# Patient Record
Sex: FEMALE | Race: ASIAN | NOT HISPANIC OR LATINO | Employment: FULL TIME | URBAN - METROPOLITAN AREA
[De-identification: names, ages, dates, MRNs, and addresses within clinical notes are randomized per-mention and may not be internally consistent; named-entity substitution may affect disease eponyms.]

---

## 2024-02-16 ENCOUNTER — TRANSFERRED RECORDS (OUTPATIENT)
Dept: HEALTH INFORMATION MANAGEMENT | Facility: CLINIC | Age: 29
End: 2024-02-16
Payer: COMMERCIAL

## 2024-02-16 ENCOUNTER — TELEPHONE (OUTPATIENT)
Dept: OBGYN | Facility: CLINIC | Age: 29
End: 2024-02-16
Payer: COMMERCIAL

## 2024-02-16 NOTE — TELEPHONE ENCOUNTER
Called Giulia to schedule her earlier with US.    Rescheduled OBI to next Monday, first provider appt for 2/27. No ultrasound needed.      Pt has reached out to her previous OB/GYN clinic, who should be sending over records ASAP.

## 2024-02-19 ENCOUNTER — VIRTUAL VISIT (OUTPATIENT)
Dept: OBGYN | Facility: CLINIC | Age: 29
End: 2024-02-19
Attending: ADVANCED PRACTICE MIDWIFE
Payer: COMMERCIAL

## 2024-02-19 DIAGNOSIS — Z34.93 PRENATAL CARE IN THIRD TRIMESTER: Primary | ICD-10-CM

## 2024-02-19 RX ORDER — OMEGA-3 FATTY ACIDS/FISH OIL 300-1000MG
715 CAPSULE ORAL
COMMUNITY

## 2024-02-19 RX ORDER — DIPHENHYDRAMINE HCL 25 MG
25 TABLET ORAL EVERY 6 HOURS PRN
Status: ON HOLD | COMMUNITY
End: 2024-03-30

## 2024-02-19 NOTE — LETTER
2/19/2024     RE: Giulia Spivey  [address]  [facility] [address]     Dear Colleague,    Thank you for referring your patient, Giulia Spivey, to the [facility] [place] at [facility]. Please see a copy of my visit note below.    LES RN Transfer of Care Intake note    28 year old female newly pregnant.  LMP: 6-9-23.    regular cycles  Pregnancy is planned.    Patient is transferring care from: OB GYN Specialist in [place] and has had 10 prenatal visits with this clinic. Gestational age at first OB visit with this pregnancy was 8 weeks    The reason the patient is transferring care is: closer for them and wanted midwives    Partner/support person name and relationship - Reuben.        Symptoms since LMP include nausea in her 1st trimester, bowel changes, breast tenderness, and fatigue. Patient has tried these relief   measures: increased rest and increased fluids.      OB HISTORY  OB History   No obstetric history on file.     OB COMPLICATIONS  *First Pregnancy     PERSONAL/SOCIAL HISTORY    lives with their spouse.  Employment: Full time as a  .  Job involves light activity .  Her partner works as a .     MENTAL HEALTH HISTORY:  none      - Current Medications reviewed   No current outpatient medications on file.           - Co-morbids none No past medical history on file.    - Pre pregnancy weight 150 pounds  pre pregnancy BMI     - Genetic/Infection questionnaire completed, risks include none. Discussed genetic screening options, patient had completed at previous OB first trimester genetic screening  Pt  does not have a recent known exposure to Parvo or CMV so IgG/IgM testing WILL NOT be ordered.   - Labs already drawn this pregnancy include: had drawn at previous OB  - Ultrasound done at 8-8-23 weeks gestation on 8 weeks. Anatomy scan completed on 10-19-23 was 19 weeks  Flu Vaccine.  Would like at visit  COVID  Vaccine: Hx of COVID illness  (details below) and Has received most recent COVID booster. Had Covid 3 years ago  RHogam: not needed  Tdap: received this pregnancy at 28 week visit  - Discussed expectations for routine prenatal care and scheduling.  - Discussed highlights from The Expectant Family booklet on warning signs, safe pregnancy and prevention of infections diseases, nutrition and exercise.  - Patient was encouraged to start prenatal vitamins as tolerated, if experiencing nausea and vomiting then OK to switch to folic acid only at this time.    - Additional items: None  - Reconciled and reviewed problem list  - Pt scheduled for NOB on 2-27-24  -patient has had anatomy scan at 19 weeks  -did have another US on 1-25-24 due to her fundal height was measuring small, but baby is AGA per US  -instructed to have her records faxed to us at [phone]  -given instructions to go to the birth place if she feels she is in labor before we see her or to call us at [phone]    Liz Javed RN

## 2024-02-19 NOTE — PROGRESS NOTES
LES RN Transfer of Care Intake note    28 year old female newly pregnant.  LMP: 6-9-23.    regular cycles  Pregnancy is planned.    Patient is transferring care from: OB GYN Specialist in [place] and has had 10 prenatal visits with this clinic. Gestational age at first OB visit with this pregnancy was 8 weeks    The reason the patient is transferring care is: closer for them and wanted midwives    Partner/support person name and relationship - Reuben.        Symptoms since LMP include nausea in her 1st trimester, bowel changes, breast tenderness, and fatigue. Patient has tried these relief   measures: increased rest and increased fluids.      OB HISTORY  OB History   No obstetric history on file.       OB COMPLICATIONS  *First Pregnancy       PERSONAL/SOCIAL HISTORY    lives with their spouse.  Employment: Full time as a  .  Job involves light activity .  Her partner works as a .     MENTAL HEALTH HISTORY:  none      - Current Medications reviewed   No current outpatient medications on file.           - Co-morbids none No past medical history on file.    - Pre pregnancy weight 150 pounds  pre pregnancy BMI     - Genetic/Infection questionnaire completed, risks include none. Discussed genetic screening options, patient had completed at previous OB first trimester genetic screening  Pt  does not have a recent known exposure to Parvo or CMV so IgG/IgM testing WILL NOT be ordered.   - Labs already drawn this pregnancy include: had drawn at previous OB  - Ultrasound done at 8-8-23 weeks gestation on 8 weeks. Anatomy scan completed on 10-19-23 was 19 weeks  Flu Vaccine.  Would like at visit  COVID Vaccine: Hx of COVID illness  (details below) and Has received most recent COVID booster. Had Covid 3 years ago  RHogam: not needed  Tdap: received this pregnancy at 28 week visit  - Discussed expectations for routine prenatal care and scheduling.  - Discussed highlights from The Expectant Family booklet on  warning signs, safe pregnancy and prevention of infections diseases, nutrition and exercise.  - Patient was encouraged to start prenatal vitamins as tolerated, if experiencing nausea and vomiting then OK to switch to folic acid only at this time.    - Additional items: None  - Reconciled and reviewed problem list  - Pt scheduled for NOB on 2-27-24  -patient has had anatomy scan at 19 weeks  -did have another US on 1-25-24 due to her fundal height was measuring small, but baby is AGA per US  -instructed to have her records faxed to us at [phone]  -given instructions to go to the birth place if she feels she is in labor before we see her or to call us at [phone]    Liz Javed RN

## 2024-02-19 NOTE — PATIENT INSTRUCTIONS
"Week 37 of Your Pregnancy: Care Instructions    Most babies are born between 37 and 40 weeks.   This is a good time to pack a bag to take with you to the birth. Then it will be ready to go when you are.     Learn about breastfeeding.  For example, find out about ways to hold your baby to make breastfeeding easier. And think about learning how to pump and store milk.     Know that crying is normal.  It's common for babies to cry 1 to 3 hours a day. Some cry more, and some cry less.     Learn why babies cry.  They may be hungry; have gas; need a diaper change; or feel cold, warm, tired, lonely, or tense. Sometimes they cry for unknown reasons.     Think about what will help you stay calm when your baby cries.  Taking slow, deep breaths can help. So can taking a break. It's okay to put your baby somewhere safe (like their crib) and walk away for a few minutes.     Learn about safe sleep for your baby.  Always put your baby to sleep on their back. Place them alone in a crib or bassinet with a firm, flat surface. Keep soft items like stuffed animals out of the crib.     Learn what to expect with  poop.  Your baby will have their own bowel patterns. Some babies have several bowel movements a day. Some have fewer.     Know that  babies will often have loose, yellow bowel movements.  Formula-fed babies have more formed stools. If your baby's poop looks like pellets, your baby is constipated.   Follow-up care is a key part of your treatment and safety. Be sure to make and go to all appointments, and call your doctor if you are having problems. It's also a good idea to know your test results and keep a list of the medicines you take.  Where can you learn more?  Go to https://www.[employer].net/patiented  Enter N257 in the search box to learn more about \"Week 37 of Your Pregnancy: Care Instructions.\"  Current as of: 2023               Content Version: 13.8    3717-6296 [employer].   Care " instructions adapted under license by your healthcare professional. If you have questions about a medical condition or this instruction, always ask your healthcare professional. [employer] disclaims any warranty or liability for your use of this information.      Week 38 of Your Pregnancy: Care Instructions  Believe it or not, your baby is almost here. You may notice how your baby responds to sounds, warmth, cold, and light. You may even know what kind of music your baby likes.    Even if you expect a vaginal birth, it's a good idea to learn about  section ().  is the delivery of a baby through a cut (incision) in your belly. It's a major surgery, so it has more risks than a vaginal birth.   During the first 2 weeks after the birth, limit visitors. Don't allow visitors who have colds or infections. Ask visitors to wash their hands before they touch your baby. And never let anyone smoke around your baby.     Know about unplanned C-sections.  Reasons for an unplanned  include labor that slows or stops, signs of distress in your baby, and high blood pressure or other problems for you.     Know about planned C-sections.  If your baby isn't in a head-down position for delivery (breech position), your doctor may plan a . Or you may have a planned  if you're having twins or more.     Get as much help as you can while you're in the hospital.  You can learn about feeding, diapering, and bathing your baby.     Talk to your doctor or midwife about how to care for the umbilical cord stump.  If your baby will be circumcised, also ask about how to care for that.     Ask friends or family for help, as you need it.  If you can, have another adult in your home for at least 2 or 3 days after the birth.     Try to nap when your baby naps.  This may be your best chance to get some sleep.     Watch for changes in your mental health.  For the first 1 to 2 weeks after  "birth, it's common to cry or feel sad or irritable. If these feelings last for more than 2 weeks, you may have postpartum depression. Ask your doctor for help. Postpartum depression can be treated.   Follow-up care is a key part of your treatment and safety. Be sure to make and go to all appointments, and call your doctor if you are having problems. It's also a good idea to know your test results and keep a list of the medicines you take.  Where can you learn more?  Go to https://www.[employer].net/patiented  Enter B044 in the search box to learn more about \"Week 38 of Your Pregnancy: Care Instructions.\"  Current as of: 2023               Content Version: 13.8    1604-3481 [employer].   Care instructions adapted under license by your healthcare professional. If you have questions about a medical condition or this instruction, always ask your healthcare professional. [employer] disclaims any warranty or liability for your use of this information.      Week 39 of Your Pregnancy: Care Instructions    Index babies can look different than what you see in pictures or movies. Their heads can be a strange shape right after birth. And they may have swollen eyes and red marks on their faces.   You can still get pregnant even if you are breastfeeding. If you don't want to get pregnant, talk to your doctor about birth control.   Tips for week 39 of pregnancy  If you plan to breastfeed, get prepared.     Continue to eat healthy foods.  Keep taking your prenatal vitamins during breastfeeding if your doctor recommends it.  Talk to your doctor before taking any medicines or supplements.  Choose the right birth control for you.     Intrauterine devices (IUDs) are placed in the uterus. Sometimes the IUD can be placed right after giving birth. They work for years.  Hormonal implants are placed under the skin of the arm. They also work for years.  Depo-Provera is a shot. You get it every 3 " "months.  Birth control pills can be used. They're taken every day.  Tubal ligation (tying your tubes) and vasectomy are surgeries. They're permanent.  Diaphragms, spermicide, and condoms must be used each time you have sex. If you used a diaphragm before, you should get refitted after the baby is born.  A birth control patch or ring can be used. These just can't be started until several weeks after you give birth.  Follow-up care is a key part of your treatment and safety. Be sure to make and go to all appointments, and call your doctor if you are having problems. It's also a good idea to know your test results and keep a list of the medicines you take.  Where can you learn more?  Go to https://www.[employer]/patiented  Enter A811 in the search box to learn more about \"Week 39 of Your Pregnancy: Care Instructions.\"  Current as of: 2023               Content Version: 13.8    1531-6464 [employer].   Care instructions adapted under license by your healthcare professional. If you have questions about a medical condition or this instruction, always ask your healthcare professional. [employer] disclaims any warranty or liability for your use of this information.      Weeks 40 to 41 of Your Pregnancy: Care Instructions  By week 40, you've reached your due date. Your baby could be coming any day. Most babies born after their due dates are healthy. But you may have tests to make sure everything is okay. If you feel stressed, you could try a meditation exercise, such as guided imagery. It may help you relax.    If you are past 41 weeks, your doctor may measure the amount of fluid that surrounds your baby. They may also test your baby's movement and heart rate.   If you don't start labor on your own by 41 or 42 weeks, your doctor may want to start (induce) labor. If there are other concerns, your doctor may talk to you about a .   To start (induce) your labor, your doctor may " "do any of these things.    Place a narrow tube with a small balloon on the end (balloon catheter) into your cervix.  The doctor inflates the balloon. This helps your cervix open (dilate).     Sweep the membranes (separate the lining of the amniotic sac from the uterus).  This helps the uterus make a chemical that can start contractions.     \"Break your water\" (rupture the amniotic sac).  This may be done if your cervix has started to open.     Use medicines.  They may be used to soften the cervix or start contractions.   How to do guided imagery    Close your eyes, and take a few deep breaths. Picture a peaceful setting, such as a beach or a meadow. Add a winding path. Follow the path until you feel more and more relaxed.   Take a few minutes to breathe slowly and feel the calm. When you are ready, slowly take yourself back to the present. Count to 3, and open your eyes.   Follow-up care is a key part of your treatment and safety. Be sure to make and go to all appointments, and call your doctor if you are having problems. It's also a good idea to know your test results and keep a list of the medicines you take.  Where can you learn more?  Go to https://www.[employer].net/patiented  Enter T922 in the search box to learn more about \"Weeks 40 to 41 of Your Pregnancy: Care Instructions.\"  Current as of: July 11, 2023               Content Version: 13.8    7036-8745 [employer].   Care instructions adapted under license by your healthcare professional. If you have questions about a medical condition or this instruction, always ask your healthcare professional. [employer] disclaims any warranty or liability for your use of this information.      Labor Induction  What You Need to Know  What is labor induction?  In most cases, it is best to go into labor naturally. When labor does not start on its own, we may use medicine or other methods to start (induce) labor. This is called induction, " "which:  Helps the uterus contract  Thins, softens and opens the cervix (opening of the uterus)  When is induction used?  There are two types of induction.  Medical induction may be done to protect the health of the parent or baby if:  There are medical concerns for you or your baby.  You haven't had your baby by 41 weeks.  Induction for non-medical reasons may be done at 39 weeks or later if:  You live far from the hospital.  You've been having contractions for many days.  You've given birth quickly in the past.   We will not perform an induction for non-medical reasons before 39 weeks. Studies show that babies born before 39 weeks may struggle with breathing, feeding, sleeping and staying warm. They are more likely to have health problems and may need to stay in the hospital longer. If we start your labor for medical reasons, the benefits will outweigh these risks. We will talk to you about your risks, benefits and alternatives (other options) before we start your labor.  How is induction done?  We may start your labor by doing one or more of the following:  We may need to help your cervix soften and open (sometimes called \"ripening\") to prepare it for labor. There are two ways to do this:  Medicines--these may be in the form of pills that you swallow or medicines that are put into the vagina next to the cervix.  Mechanical--using either a single or double balloon. These are small balloons that are attached to tubes that go up inside the cervix. The balloons are then filled with water to put pressure on the cervix and help the cervix soften and open. Depending on the type of balloon used, you may be allowed to go home after it is placed.  After your cervix is ready:  We may give you medicine through an IV (a small tube placed in your vein). This medicine is called Pitocin. It helps the muscles of your uterus to contract.  We may make a small opening in your bag of water (the sac around your baby). This is called an " "amniotomy. Sometimes called \"breaking the water\". It may help your uterus contract and your cervix open.  What might happen if my labor is induced?  Some of this depends on how your labor is started and how your body responds. Your labor may be more complicated. You and your baby may need more medical treatments. In general:  You may not go into labor right away. If so, we may send you home with follow-up instructions.  It will be important to monitor you and your baby during the induction.  You may not be able to move around during labor. You will have two belts with monitors attached to your belly. These allow the nurse to watch your contractions and your baby's heart rate.  Your labor may take longer than if you went into labor on your own. It might take more than one day.  If you need cervical ripening, it is important to know that it may be many hours (even days) until delivery happens.  Cervical ripening can be slow and require several doses before your body is ready to labor.  A long labor may increase the risk of infection in mother and baby.  Your labor may not progress as planned. This could lead to a  birth.  Can I plan the date of my delivery?  After 39 weeks, you may ask about planning your delivery date. This is only an option if your body--and your baby--are ready. To find out, we will check your cervix and your baby's heart rate.   If you are ready to be induced, we will give you a date and time to come to the hospital. If many patients are in labor that day, we may need to start your labor at another time.  If you are not ready, we will not start your labor. It will be safer for your baby to come on its own.  What else do I need to know?  Before you have an induction, make sure you understand the reasons, risks and benefits. Ask your doctor or nurse-midwife:  Why do I need to be induced?  What are the risks to my baby?  How will you start my labor?  How will you know if my baby is ready to " be born?  How will you know if my body is ready to give birth?  Where can I get more information?  To learn more about induction, you may visit these websites:  The American College of Nurse-Midwives:  www.mymidwife.org  The American College of Obstetricians and Gynecologists: www.acog.org  Childbirth Connection:  www.childbirthconnection.org  : www.marchofdimes.[employer]  Association of Women's Health, Obstetrics, and  Nursing  www.tumtjj4kqk.org/go-the-full-40&#047;  For informational purposes only. Not to replace the advice of your health care provider. Copyright   2008 [employer] [facility]. All rights reserved. Clinically reviewed by the  System Operations Leadership team. [employer] [id number] - REV .    Thank you for trusting us with your care!     If you need to contact us for questions about:  Symptoms, Scheduling & Medical Questions; Non-urgent (2-3 day response) Openbucks message, Urgent (needing response today) [phone] (if after 3:30pm next day response)   Prescriptions: Please call your Pharmacy   Billing: to be [phone] or  Physicians:[phone]    [facility] Cold:  Rest and drink plenty of fluids.  A vaporizer may help.    For aches and fever  Acetaminophen (Tylenol)    For Sore Throat  Gargle with warm salt water. Suck on hard candy, ice or popsicles. Eat soft, soothing foods. Drink cool or warm liquids.  You may also take:    Cough drops, such as Halls, Ricola, Cepacol or Chloraseptic.  Acetaminophen    For Cough  Avoid products that contain alcohol.  You may take:    Cough drops, such as Halls, Ricola, Cepacol or Chloraseptic.  Guaifenesin (Mucinex, plain Robitussin) for dry cough.   Dextromethorphan (plain Robitussin, Delsym) to suppress a cough.    For nasal congestion (stuffy head)  You may take:  Saline nasal spray  Afrin nasal spray. Do not use this for more than 3 or 4 days.  Pseudoephedrine (plain Sudafed). Use with caution and only after consulting  your care provider.  Do not use this if you have high blood pressure.    Allergies (such as runny nose or sneezing)  Diphenhydramine (plain Benadryl)  Chlorpheniramine (Chlor-Trimeton)  Second generation antihistamines such as Claritin (loratadine), or Zyrtec (cetirizine).    Flu (influenza) prevention  Pregnant women should be vaccinated early in the flu season (October through May) as soon as the vaccine is available regardless how far along you are in your pregnancy, (Do not use the FluMist nasal inhalation).    Headaches, pain and inflammation  Do not take ibuprofen (Advil or Motrin), naproxen sodium (Aleve), aspirin or salicylates without first speaking with your provider.  These may not be safe during all stages or pregnancy.  Instead, you may use:  Acetaminophen (Tylenol).  If Tylenol does not help your headache, call your care provider.  This could be a sign of high blood pressure.

## 2024-02-27 ENCOUNTER — LAB (OUTPATIENT)
Dept: LAB | Facility: CLINIC | Age: 29
End: 2024-02-27
Attending: ADVANCED PRACTICE MIDWIFE
Payer: COMMERCIAL

## 2024-02-27 ENCOUNTER — PRENATAL OFFICE VISIT (OUTPATIENT)
Dept: OBGYN | Facility: CLINIC | Age: 29
End: 2024-02-27
Attending: ADVANCED PRACTICE MIDWIFE
Payer: COMMERCIAL

## 2024-02-27 VITALS
DIASTOLIC BLOOD PRESSURE: 81 MMHG | BODY MASS INDEX: 29.88 KG/M2 | HEART RATE: 77 BPM | WEIGHT: 175 LBS | SYSTOLIC BLOOD PRESSURE: 125 MMHG | HEIGHT: 64 IN

## 2024-02-27 DIAGNOSIS — Z34.93 NORMAL PREGNANCY IN THIRD TRIMESTER: Primary | ICD-10-CM

## 2024-02-27 DIAGNOSIS — Z34.93 NORMAL PREGNANCY IN THIRD TRIMESTER: ICD-10-CM

## 2024-02-27 LAB
ERYTHROCYTE [DISTWIDTH] IN BLOOD BY AUTOMATED COUNT: 14.2 % (ref 10–15)
HBV SURFACE AB SERPL IA-ACNC: 85.2 M[IU]/ML
HBV SURFACE AB SERPL IA-ACNC: REACTIVE M[IU]/ML
HCT VFR BLD AUTO: 35.5 % (ref 35–47)
HGB BLD-MCNC: 11.6 G/DL (ref 11.7–15.7)
MCH RBC QN AUTO: 26.9 PG (ref 26.5–33)
MCHC RBC AUTO-ENTMCNC: 32.7 G/DL (ref 31.5–36.5)
MCV RBC AUTO: 82 FL (ref 78–100)
PLATELET # BLD AUTO: 232 10E3/UL (ref 150–450)
RBC # BLD AUTO: 4.32 10E6/UL (ref 3.8–5.2)
VIT D+METAB SERPL-MCNC: 26 NG/ML (ref 20–50)
WBC # BLD AUTO: 10.8 10E3/UL (ref 4–11)

## 2024-02-27 PROCEDURE — 99207 PR PRENATAL VISIT: CPT | Performed by: ADVANCED PRACTICE MIDWIFE

## 2024-02-27 PROCEDURE — 86706 HEP B SURFACE ANTIBODY: CPT

## 2024-02-27 PROCEDURE — 82306 VITAMIN D 25 HYDROXY: CPT

## 2024-02-27 PROCEDURE — 85027 COMPLETE CBC AUTOMATED: CPT

## 2024-02-27 PROCEDURE — 36415 COLL VENOUS BLD VENIPUNCTURE: CPT

## 2024-02-27 PROCEDURE — 99213 OFFICE O/P EST LOW 20 MIN: CPT | Performed by: ADVANCED PRACTICE MIDWIFE

## 2024-02-27 ASSESSMENT — PAIN SCALES - GENERAL: PAINLEVEL: SEVERE PAIN (6)

## 2024-02-27 NOTE — PROGRESS NOTES
"Transfer of Care  Subjective:  28 year old woman presents to clinic for transfer of OB care appointment.  Patient's last menstrual period was 2023.  at 37w4d by Estimated Date of Delivery: Mar 15, 2024 based on LMP.    - Feels well overall, having a boy!  + fetal movement. Denies cramping/contractions. Denies leaking of fluid or vaginal bleeding.  - Pt was receiving prenatal care from [facility] [place]. Initiated prenatal care at weeks, has had 10 visit total.    - Reason for transfer to [facility] care, desires midwifery care.  - prenatal records available in Epic, reviewed and faxed.   - After review of prenatal records, additional routine orders are recommended including   - Pre pregnancy BMI . Pre-pregnancy Weight 156lb.  Height 5'4\".   Prepregnancy BMI 26.8    Other concerns:   - Pt reports round ligament pain, sciatica, as well as intermittent anterior leg spasms that radiate from pelvis. Pt reports spasms self-limiting, resolve quickly. Pt also reports spasms occasionally accompanied by L leg numbness, which she has had to lay down to resolve.  - Also reports mild nasal congestion, inquiring re: safe medications  - Planning unmedicated delivery, has a , did a tour of birthplace. Planning on breastfeeding. Completed birth plan, will bring next visit. Interested in water birth.  - GBS collected at previous clinic, negative per patient report. Clinic faxing results.    Current Medications:   Current Outpatient Medications   Medication Sig Dispense Refill    diphenhydrAMINE (BENADRYL) 25 MG tablet Take 25 mg by mouth every 6 hours as needed for itching or allergies      omega 3 1000 MG CAPS Take 715 mg by mouth      Prenatal Vit-Fe Fumarate-FA (PRENATAL MULTIVITAMIN  PLUS IRON) 27-1 MG TABS Take by mouth daily           Co-morbids  History reviewed. No pertinent past medical history.    Risk for GDM -  No risk factors. Passed 1hr     PERSONAL/SOCIAL HISTORY  Partner is involved, Reuben, present and " "supportive. Lives with their spouse.  Employment: Full time, . Planned pregnancy, having a boy!     PSYCHIATRIC:  Denies history of depression/anxiety     Past History:  Her past medical history History reviewed. No pertinent past medical history..   This is her first pregnancy  Since her last LMP she denies use of alcohol, tobacco and street drugs.    HISTORY:  Family History   Problem Relation Age of Onset    Hypothyroidism Mother     No Known Problems Father     No Known Problems Brother     Diabetes Paternal Grandfather         type 2    Hyperlipidemia Paternal Grandfather     Hypertension Paternal Grandfather      Social History     Socioeconomic History    Marital status:      Spouse name: Reuben    Number of children: 0   Tobacco Use    Smoking status: Never    Smokeless tobacco: Never   Vaping Use    Vaping Use: Never used   Substance and Sexual Activity    Alcohol use: Not Currently    Drug use: Never     Current Outpatient Medications   Medication Sig    diphenhydrAMINE (BENADRYL) 25 MG tablet Take 25 mg by mouth every 6 hours as needed for itching or allergies    omega 3 1000 MG CAPS Take 715 mg by mouth    Prenatal Vit-Fe Fumarate-FA (PRENATAL MULTIVITAMIN  PLUS IRON) 27-1 MG TABS Take by mouth daily     No current facility-administered medications for this visit.     No Known Allergies    ============================================  MEDICAL HISTORY  History reviewed. No pertinent past medical history.  Past Surgical History:   Procedure Laterality Date    WISDOM TOOTH EXTRACTION Bilateral 2013     GYN History- pap history not in sent records    I personally reviewed the past social/family/medical and surgical history on the date of service.     ROS: 10 point ROS neg other than the symptoms noted above in the HPI.    Objective  /81   Pulse 77   Ht 1.626 m (5' 4\")   Wt 79.4 kg (175 lb)   LMP 06/09/2023   BMI 30.04 kg/m     EXAM:  GENERAL:  Pleasant pregnant female, alert, " cooperative and well groomed.  SKIN:  Warm and dry, without lesions or rashes  HEAD: Symmetrical features.  NECK:  Thyroid without enlargement and nodules.  Lymph nodes not palpable.   LUNGS:  Clear to auscultation, equal bilaterally  HEART:  RRR without murmur.  ABDOMEN: Soft without masses , tenderness or organomegaly  Uterus palpable at size equal to dates. Fetal heart tones present.  MUSCULOSKELETAL:  Full range of motion  EXTREMITIES:  No edema. No significant varicosities.     Prenatal Labs:  Varicella - Nonimmune  Type and screen - O positive. 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 (Reuben neg)    HgB - 10.4 in December. Not started on supplementation.    Routine prenatal:   GCT 1hr - passed   Tdap - received 23  GBS - neg 24    Ultrasounds:   23 Dating IUP 8w0d c/w LMP  10/19/23 Anatomy scan - WNL  24 US (size<dates) - EFW 40%ile, cephalic. LIZBETH 14. Anterior placenta.      Assessment/Plan  28 year old , 37w4d weeks of pregnancy with DAYNA of Mar 15, 2024 by LMP, confirmed by US    Orders Placed This Encounter   Procedures    CBC with Platelets    Vitamin D Deficiency    Hepatitis B Surface Antibody     - Oriented to Practice, types of care, and how to reach a provider.  Pt prefers CNM team  - Reviewd preeclampsia warning signs, labor signs, when and how to call, fetal kick counts.  - Educational handouts provided - pregnancy complication warning signs, early labor, water birth, 28wk EOB packet.  OTC medication handout given.   - Reviewed use of triage nurse line and contacting the on-call provider after hours for an urgent need such as fever, vagina bleeding, bladder or vaginal infection, rupture of membranes,  or term labor.    - Patient was sent to lab for additional OB labs including Hep B  sAB, CBC, and vitamin D.   - Recommended pregnancy support band for round ligament pain/sciatica. If symptoms change or worsening, to call clinic.  - Offered CBC today vs. Staring iron supplement. Pt agreeable to re-check today.  - Pregnancy concerns to be addressed by provider at next OB visit include: none.  - All pt's and partner's, Reuben, questions discussed and answered. Pt verbalized understanding of and agreement to plan of care.   - Return to clinic in 1 wks, prn if questions or concerns    I, Christine Tejada, am serving as a scribe; to document services personally performed by Rony Carvajal CNM based on data collection and the provider's statements to me. - YAMILET Darby   The encounter was performed by me and scribed by the student. The scribed note accurately reflects my personal services and decisions made by me. WYATT Cheatham CNM, APRN CNM

## 2024-02-28 ENCOUNTER — TELEPHONE (OUTPATIENT)
Dept: OBGYN | Facility: CLINIC | Age: 29
End: 2024-02-28
Payer: COMMERCIAL

## 2024-02-28 PROBLEM — E55.9 VITAMIN D DEFICIENCY: Status: ACTIVE | Noted: 2024-02-28

## 2024-02-28 NOTE — TELEPHONE ENCOUNTER
I did call and speak with Giulia to go over her low Vitamin D level with her.       I instructed her to start taking Vitamin D3 2,000IU daily.     I inquired if she would like this called in or would she like to pick it up over the counter.     Patient stated she will pick it up over the counter.     All questions were answered.

## 2024-02-29 ENCOUNTER — MYC MEDICAL ADVICE (OUTPATIENT)
Dept: OBGYN | Facility: CLINIC | Age: 29
End: 2024-02-29
Payer: COMMERCIAL

## 2024-03-04 ENCOUNTER — PRENATAL OFFICE VISIT (OUTPATIENT)
Dept: OBGYN | Facility: CLINIC | Age: 29
End: 2024-03-04
Attending: ADVANCED PRACTICE MIDWIFE
Payer: COMMERCIAL

## 2024-03-04 VITALS
WEIGHT: 177.6 LBS | HEIGHT: 64 IN | SYSTOLIC BLOOD PRESSURE: 117 MMHG | BODY MASS INDEX: 30.32 KG/M2 | DIASTOLIC BLOOD PRESSURE: 77 MMHG | HEART RATE: 88 BPM

## 2024-03-04 DIAGNOSIS — O48.0 POST-TERM PREGNANCY, 40-42 WEEKS OF GESTATION: ICD-10-CM

## 2024-03-04 DIAGNOSIS — Z34.93 PRENATAL CARE IN THIRD TRIMESTER: ICD-10-CM

## 2024-03-04 DIAGNOSIS — E55.9 VITAMIN D DEFICIENCY: ICD-10-CM

## 2024-03-04 DIAGNOSIS — O09.93 SUPERVISION OF HIGH RISK PREGNANCY IN THIRD TRIMESTER: Primary | ICD-10-CM

## 2024-03-04 PROCEDURE — 90686 IIV4 VACC NO PRSV 0.5 ML IM: CPT

## 2024-03-04 PROCEDURE — 250N000011 HC RX IP 250 OP 636

## 2024-03-04 PROCEDURE — 99207 PR PRENATAL VISIT: CPT | Performed by: ADVANCED PRACTICE MIDWIFE

## 2024-03-04 PROCEDURE — G0008 ADMIN INFLUENZA VIRUS VAC: HCPCS

## 2024-03-04 PROCEDURE — 99213 OFFICE O/P EST LOW 20 MIN: CPT | Mod: 25 | Performed by: ADVANCED PRACTICE MIDWIFE

## 2024-03-04 NOTE — PATIENT INSTRUCTIONS
Thank you for trusting us with your care!     If you need to contact us for questions about:  Symptoms, Scheduling & Medical Questions; Non-urgent (2-3 day response) Obdulia message, Urgent (needing response today) [phone] (if after 3:30pm next day response)   Prescriptions: Please call your Pharmacy   Billing: Benji [phone] or [facility]:[phone]

## 2024-03-04 NOTE — PROGRESS NOTES
"Subjective:     28 year old  at 38w3d presents for routine prenatal visit.            Denies vaginal bleeding or leakage of fluid.  Irregular contractions.  Feeling fetal movement.       Patient concerns: Feeling more pelvic pressure, and occasional back aches.      She is wearing a support belt to help with pubic bone pain.    Objective:  Vitals:    24 0947   BP: 117/77   Pulse: 88   Weight: 80.6 kg (177 lb 9.6 oz)   Height: 1.626 m (5' 4\")    See OB flowsheet  Assessment/Plan     Encounter Diagnoses   Name Primary?    Vitamin D deficiency     Post-term pregnancy, 40-42 weeks of gestation     Supervision of high risk pregnancy in third trimester Yes    Prenatal care in third trimester      Orders Placed This Encounter   Procedures    US OB Fetal Biophys Prf wo [facility]    INFLUENZA VACCINE >6 MONTHS (AFLURIA/FLUZONE)     Orders Placed This Encounter   Medications    VITAMIN D PO     - Reviewed postdates testing including BPP => 40 weeks and rationale for induction of labor based on results. Ordered BPP for future. Patient to schedule with future appointment.  - Reviewed why/how to contact provider if headache/visual changes/RUQ or epigastric pain, decreased fetal movement, vaginal bleeding, leakage of fluid or strong/regular contractions.   Patient education/orders or handouts today:  When to call for labor or other concern  - Discussed how to find pediatrician care  -Flu shot given today    Return to clinic in 1 week and prn if questions or concerns.     I, SNM, completed the PFSH and ROS. I then acted as a scribe for CNM for the remainder of the visit. - Glenis BARKSDALE, DNP student    I, WYATT Griffin, CNM, was present with the medical/STEVEN student who participated in the service and in the documentation of the note.  I have verified the history and personally performed the physical exam and medical decision making.  I agree with the assessment and plan of care as documented in the " note.    WYATT Griffin, PRANAY

## 2024-03-10 ENCOUNTER — HEALTH MAINTENANCE LETTER (OUTPATIENT)
Age: 29
End: 2024-03-10

## 2024-03-11 ENCOUNTER — PRENATAL OFFICE VISIT (OUTPATIENT)
Dept: OBGYN | Facility: CLINIC | Age: 29
End: 2024-03-11
Attending: ADVANCED PRACTICE MIDWIFE
Payer: COMMERCIAL

## 2024-03-11 ENCOUNTER — TRANSCRIBE ORDERS (OUTPATIENT)
Dept: MATERNAL FETAL MEDICINE | Facility: CLINIC | Age: 29
End: 2024-03-11

## 2024-03-11 VITALS
DIASTOLIC BLOOD PRESSURE: 83 MMHG | BODY MASS INDEX: 29.65 KG/M2 | WEIGHT: 173.7 LBS | HEART RATE: 84 BPM | HEIGHT: 64 IN | SYSTOLIC BLOOD PRESSURE: 134 MMHG

## 2024-03-11 DIAGNOSIS — O48.0 POST-TERM PREGNANCY, 40-42 WEEKS OF GESTATION: ICD-10-CM

## 2024-03-11 DIAGNOSIS — E55.9 VITAMIN D DEFICIENCY: ICD-10-CM

## 2024-03-11 DIAGNOSIS — O09.93 SUPERVISION OF HIGH RISK PREGNANCY IN THIRD TRIMESTER: Primary | ICD-10-CM

## 2024-03-11 DIAGNOSIS — O26.90 PREGNANCY RELATED CONDITION, ANTEPARTUM: Primary | ICD-10-CM

## 2024-03-11 PROCEDURE — 99207 PR PRENATAL VISIT: CPT | Performed by: ADVANCED PRACTICE MIDWIFE

## 2024-03-11 PROCEDURE — 99213 OFFICE O/P EST LOW 20 MIN: CPT | Performed by: ADVANCED PRACTICE MIDWIFE

## 2024-03-11 NOTE — PROGRESS NOTES
"Subjective:     28 year old  at 39w3d presents for routine prenatal visit with  Reuben.      She has noticed more mucus but no vaginal bleeding or leakage of fluid.  She has some divya lozano contractions but no increased intensity or frequency of contractions.  Positive fetal movements, baby is head down.   No headaches, vision changes, RUQ or epigastric pain.   Eating frequent small meals, no nausea/vomiting.   Her main question for today is regarding baby positioning.     Objective:  Vitals:    24 1005   BP: 134/83   Pulse: 84   Weight: 78.8 kg (173 lb 11.2 oz)   Height: 1.62 m (5' 3.78\")    See OB flowsheet    Assessment/Plan     Encounter Diagnoses   Name Primary?    Vitamin D deficiency     Post-term pregnancy, 40-42 weeks of gestation Yes     Orders Placed This Encounter   Procedures    [facility] Referral - Pregnancy     No orders of the defined types were placed in this encounter.    Discussed plans of labor and birth, updated the individualized prenatal care plan on the problem list  - Reviewed why/how to contact provider if headache/visual changes/RUQ or epigastric pain, decreased fetal movement, vaginal bleeding, leakage of fluid or strong/regular contractions  - Open to membrane sweep at next appointment. Has BPP scheduled for 40w4d. Unsure about IOL at this time. Discuss IOL more at next appointment.   Return to clinic in 1 week and prn if questions or concerns.     Puja John, MS3  I, WYATT Griffin, PRANAY, was present with the medical/STEVEN student who participated in the service and in the documentation of the note.  I have verified the history and personally performed the physical exam and medical decision making.  I agree with the assessment and plan of care as documented in the note.    WYATT Griffin, PRANAY       "

## 2024-03-15 ENCOUNTER — PRE VISIT (OUTPATIENT)
Dept: MATERNAL FETAL MEDICINE | Facility: CLINIC | Age: 29
End: 2024-03-15
Payer: COMMERCIAL

## 2024-03-17 NOTE — PROGRESS NOTES
"Subjective:     28 year old  at 40w3d presents for routine prenatal visit.            Denies vaginal bleeding or leakage of fluid.  Denies contractions.  Active fetal movement.        No HA, visual changes, RUQ or epigastric pain.   Patient concerns: none,Feeling well overall.  - Reviewed risk/benefits of membrane sweep. Pt would like this today.  - 3/19 BPP scheduled, patient aware    Objective:  Vitals:    24 0851   BP: 127/78   Pulse: 86   Weight: 81 kg (178 lb 8 oz)   Height: 1.62 m (5' 3.78\")    See OB flowsheet  Assessment/Plan   (Z34.03) Encounter for supervision of normal first pregnancy in third trimester  (primary encounter diagnosis)  Comment: ***  Plan: ***    (Z34.93) Normal pregnancy in third trimester  Comment: ***  Plan: ***    (E55.9) Vitamin D deficiency  Comment: ***  Plan: ***      - Reviewed postdates testing including BPP => 41 weeks and rationale for induction of labor based on results. Patient desires an induction of labor on 3/27/24 if she doesn't go into spontaneous labor.   -Has a   - Reviewed why/how to contact provider if headache/visual changes/RUQ or epigastric pain, decreased fetal movement, vaginal bleeding, leakage of fluid or strong/regular contractions.   Patient education/orders or handouts today:  {PtEd/orders/[id number]}    ***SCHEDULE IOL 3/27 at 1930  Return to clinic in 1 week and prn if questions or concerns.   Veronique Espinal     "

## 2024-03-18 ENCOUNTER — PRENATAL OFFICE VISIT (OUTPATIENT)
Dept: OBGYN | Facility: CLINIC | Age: 29
End: 2024-03-18
Attending: MIDWIFE
Payer: COMMERCIAL

## 2024-03-18 ENCOUNTER — TELEPHONE (OUTPATIENT)
Dept: OBGYN | Facility: CLINIC | Age: 29
End: 2024-03-18
Payer: COMMERCIAL

## 2024-03-18 VITALS
BODY MASS INDEX: 30.48 KG/M2 | DIASTOLIC BLOOD PRESSURE: 78 MMHG | HEART RATE: 86 BPM | SYSTOLIC BLOOD PRESSURE: 127 MMHG | WEIGHT: 178.5 LBS | HEIGHT: 64 IN

## 2024-03-18 DIAGNOSIS — Z34.03 ENCOUNTER FOR SUPERVISION OF NORMAL FIRST PREGNANCY IN THIRD TRIMESTER: Primary | ICD-10-CM

## 2024-03-18 DIAGNOSIS — E55.9 VITAMIN D DEFICIENCY: ICD-10-CM

## 2024-03-18 PROCEDURE — 99207 PR PRENATAL VISIT: CPT | Performed by: MIDWIFE

## 2024-03-18 PROCEDURE — 99213 OFFICE O/P EST LOW 20 MIN: CPT | Performed by: MIDWIFE

## 2024-03-18 NOTE — PROGRESS NOTES
"Subjective:     28 year old  at 40w3d presents for routine prenatal visit.            Denies vaginal bleeding or leakage of fluid.  Denies contractions.  Active fetal movement.        No HA, visual changes, RUQ or epigastric pain.   Patient concerns: none,Feeling well overall.  - Reviewed risk/benefits of membrane sweep. Pt would like this today.  - 3/19 BPP scheduled, patient aware    Objective:  Vitals:    24 0851   BP: 127/78   Pulse: 86   Weight: 81 kg (178 lb 8 oz)   Height: 1.62 m (5' 3.78\")    See OB flowsheet  SVE 2cm, 70%, -2, mid, soft (stretched to 4cm during membrane sweep)     Assessment/Plan   (Z34.03) Encounter for supervision of normal first pregnancy in third trimester  (primary encounter diagnosis)  Comment:   - Reviewed postdates testing including BPP => 41 weeks and rationale for induction of labor based on results. Plan BPP tomorrow as scheduled and again on Friday/. Patient desires an induction of labor on 3/27/24 at 1930 41w5d if she doesn't go into spontaneous labor.   -Discussed risks, benefits and alternatives of induction of labor using pitocin versus cervical ripening including pharmacological and mechanical methods. Advised patient decision will be based on the james score  -SVE 2 cm/7-%/-2/mid/soft, membrane sweep completed today, boody show seen. Reviewed cramping on and off and bloody show would be expected  -Discussed continuous monitoring during the induction process  -Discussed recommendation for IV cannula in place, patient is agreeable  -Has a BPP scheduled for tomorrow. Orders placed for a BPP next week  - Reviewed why/how to contact provider if headache/visual changes/RUQ or epigastric pain, decreased fetal movement, vaginal bleeding, leakage of fluid or strong/regular contractions.   Patient education/orders or handouts today:  Sign/symptoms of labor, When to call for labor or other concerns, Postdates testing discussion, and Induction of labor  Plan: IOL on " "3/27/23 @1930     (E55.9) Vitamin D deficiency  Comment: Taking vitamin D supplements    Return to clinic in 1 week and prn if questions or concerns.     I, Veronique WOODARD, am serving as a scribe to document services personally performed by CNM based on the provider's statements to me.\" Veronique WOODARD    The encounter was performed by me and scribed by the SNM. The scribed note accurately reflects my personal services and decisions made by me.     WYATT Hilario, RICARDOM         "

## 2024-03-18 NOTE — TELEPHONE ENCOUNTER
LVM to inform pt she needs to call the radiology number to schedule US on 3/25 and provided radiology number.

## 2024-03-18 NOTE — PATIENT INSTRUCTIONS
Thank you for trusting us with your care!     If you need to contact us for questions about:  Symptoms, Scheduling & Medical Questions; Non-urgent (2-3 day response) Obdulia message, Urgent (needing response today) [phone] (if after 3:30pm next day response)   Prescriptions: Please call your Pharmacy   Billing: Benji [phone] or [facility]:[phone]

## 2024-03-19 ENCOUNTER — OFFICE VISIT (OUTPATIENT)
Dept: MATERNAL FETAL MEDICINE | Facility: CLINIC | Age: 29
End: 2024-03-19
Attending: OBSTETRICS & GYNECOLOGY
Payer: COMMERCIAL

## 2024-03-19 ENCOUNTER — HOSPITAL ENCOUNTER (OUTPATIENT)
Dept: ULTRASOUND IMAGING | Facility: CLINIC | Age: 29
Discharge: HOME | End: 2024-03-19
Attending: OBSTETRICS & GYNECOLOGY
Payer: COMMERCIAL

## 2024-03-19 DIAGNOSIS — O26.90 PREGNANCY RELATED CONDITION, ANTEPARTUM: ICD-10-CM

## 2024-03-19 DIAGNOSIS — O48.0 POST-TERM PREGNANCY, 40-42 WEEKS OF GESTATION: Primary | ICD-10-CM

## 2024-03-19 PROCEDURE — 76819 FETAL BIOPHYS PROFIL W/O NST: CPT | Mod: 26 | Performed by: OBSTETRICS & GYNECOLOGY

## 2024-03-19 PROCEDURE — 76819 FETAL BIOPHYS PROFIL W/O NST: CPT

## 2024-03-19 NOTE — PROGRESS NOTES
Please refer to ultrasound report under 'Imaging' Studies of 'Chart Review' tabs.    Malcolm Ordonez M.D.

## 2024-03-19 NOTE — NURSING NOTE
Patient reports positive fetal movement, denies pain, some BH/back contractions, denies leaking of fluid, or bleeding. Pt states IOL is scheduled for next week Tuesday or Wednesday.  Education provided to patient on ultrasound.  SBAR given to VON HERR, see their note in Epic.

## 2024-03-21 ENCOUNTER — TELEPHONE (OUTPATIENT)
Dept: OBGYN | Facility: CLINIC | Age: 29
End: 2024-03-21
Payer: COMMERCIAL

## 2024-03-21 NOTE — TELEPHONE ENCOUNTER
[facility] Call Center    Phone Message    May a detailed message be left on voicemail: yes     Reason for Call: Other: Pt had a membrane sweep this past Monday (3/18) and has another appt scheduled for 3/25, but is wondering if she could come in again this week for another membrane sweep. Advised to send TE per secure chat nurse for midwife team to review. Please advise. Thank you.      Action Taken: Other: ump whs    Travel Screening: Not Applicable

## 2024-03-22 ENCOUNTER — PRENATAL OFFICE VISIT (OUTPATIENT)
Dept: OBGYN | Facility: CLINIC | Age: 29
End: 2024-03-22
Attending: ADVANCED PRACTICE MIDWIFE
Payer: COMMERCIAL

## 2024-03-22 VITALS
DIASTOLIC BLOOD PRESSURE: 82 MMHG | HEIGHT: 64 IN | HEART RATE: 76 BPM | BODY MASS INDEX: 30.42 KG/M2 | SYSTOLIC BLOOD PRESSURE: 134 MMHG | WEIGHT: 178.2 LBS

## 2024-03-22 DIAGNOSIS — Z34.93 NORMAL PREGNANCY IN THIRD TRIMESTER: Primary | ICD-10-CM

## 2024-03-22 PROCEDURE — 99213 OFFICE O/P EST LOW 20 MIN: CPT | Performed by: REGISTERED NURSE

## 2024-03-22 PROCEDURE — 99207 PR PRENATAL VISIT: CPT | Performed by: REGISTERED NURSE

## 2024-03-22 NOTE — PROGRESS NOTES
"Subjective:     28 year old  at 41w0d presents for routine prenatal visit.    no vaginal bleeding or leakage of fluid. Intermittent contractions or cramping.    Pos fetal movement.       No HA, visual changes, RUQ or epigastric pain.       Patient concerns: none. Has started pumping. Desires membrane sweep.     Objective:  Vitals:    24 1438   BP: 134/82   Pulse: 76   Weight: 80.8 kg (178 lb 3.2 oz)   Height: 1.62 m (5' 3.78\")    See OB flowsheet  Cervix: 3cm/70%/-1/mid/soft. Membrane sweep performed.   Assessment/Plan     Encounter Diagnosis   Name Primary?    Normal pregnancy in third trimester Yes     No orders of the defined types were placed in this encounter.    No orders of the defined types were placed in this encounter.    Discussed plans of labor and birth, updated the individualized prenatal care plan on the problem list  - Suspect OP position of fetus by Leopold's. Discussed side lying release at home to encourage rotation of fetus into more optimal position for labor.   - Reviewed why/how to contact provider if headache/visual changes/RUQ or epigastric pain, decreased fetal movement, vaginal bleeding, leakage of fluid or strong/regular contractions  Return to clinic in 1 week and prn if questions or concerns.   WYATT Valladares CNM      "

## 2024-03-25 ENCOUNTER — HOSPITAL ENCOUNTER (OUTPATIENT)
Dept: ULTRASOUND IMAGING | Facility: CLINIC | Age: 29
Discharge: HOME | End: 2024-03-25
Attending: MIDWIFE
Payer: COMMERCIAL

## 2024-03-25 ENCOUNTER — PRENATAL OFFICE VISIT (OUTPATIENT)
Dept: OBGYN | Facility: CLINIC | Age: 29
End: 2024-03-25
Attending: ADVANCED PRACTICE MIDWIFE
Payer: COMMERCIAL

## 2024-03-25 VITALS
DIASTOLIC BLOOD PRESSURE: 88 MMHG | WEIGHT: 176 LBS | SYSTOLIC BLOOD PRESSURE: 133 MMHG | HEART RATE: 76 BPM | BODY MASS INDEX: 30.42 KG/M2

## 2024-03-25 DIAGNOSIS — Z23 NEED FOR VARICELLA VACCINE: ICD-10-CM

## 2024-03-25 DIAGNOSIS — Z34.93 NORMAL PREGNANCY IN THIRD TRIMESTER: Primary | ICD-10-CM

## 2024-03-25 DIAGNOSIS — Z34.03 ENCOUNTER FOR SUPERVISION OF NORMAL FIRST PREGNANCY IN THIRD TRIMESTER: ICD-10-CM

## 2024-03-25 PROCEDURE — 76819 FETAL BIOPHYS PROFIL W/O NST: CPT

## 2024-03-25 PROCEDURE — 99213 OFFICE O/P EST LOW 20 MIN: CPT | Mod: 25 | Performed by: ADVANCED PRACTICE MIDWIFE

## 2024-03-25 PROCEDURE — 76819 FETAL BIOPHYS PROFIL W/O NST: CPT | Mod: 26 | Performed by: RADIOLOGY

## 2024-03-25 PROCEDURE — 59425 ANTEPARTUM CARE ONLY: CPT | Performed by: ADVANCED PRACTICE MIDWIFE

## 2024-03-25 NOTE — PROGRESS NOTES
Subjective:     28 year old  at 41w3d presents for routine prenatal visit.     Patient concerns: Feeling well overall. Experiencing more uterine tight uterine cramping and tightening. Has had 2 membrane sweeps- felt some cramping.     BPP today 8/8, cephalic, MVP WNL.    Desires spontaneous labor but agreeable to induction. Currently scheduled for 3/27 but interested in changing it to 3/28.     Would like cervical exam and membrane sweep today.    Objective:  Vitals:    24 1549   BP: 133/88   Pulse: 76   Weight: 79.8 kg (176 lb)        See OB flowsheet    /, mid/soft. Membrane sweep performed    Ultrasound:  FINDINGS:   Fetal Age: 41 weeks, 3 days  Amniotic Fluid Index: 6.3 cm  Fetal Heart Rate: 122 bpm  Fetal Orientation: Cephalic  Placental Location: Anterior     Fetal Breathin  Gross Body Movement: 2   Fetal Tone: 2  Amniotic Fluid Volume: 2      Total Score: 8/8      IMPRESSION: Normal biophysical profile.    Assessment/Plan     Encounter Diagnosis   Name Primary?    Normal pregnancy in third trimester Yes     - Reviewed why/how to contact provider if headache/visual changes/RUQ or epigastric pain, decreased fetal movement, vaginal bleeding, leakage of fluid or strong/regular contractions.   Patient education/orders or handouts today:  Sign/symptoms of labor, When to call for labor or other concerns, Postdates testing discussion, BPP, and Induction of labor    - Reviewed normal BPP.  - IOL changed to 3/28 per pt preference. Scheduled for .     WYATT Agustin, PRANAY

## 2024-03-28 ENCOUNTER — HOSPITAL ENCOUNTER (INPATIENT)
Facility: CLINIC | Age: 29
LOS: 2 days | Discharge: HOME HEALTH | End: 2024-03-30
Attending: MIDWIFE | Admitting: MIDWIFE
Payer: COMMERCIAL

## 2024-03-28 DIAGNOSIS — Z37.9 VACUUM-ASSISTED VAGINAL DELIVERY: Primary | ICD-10-CM

## 2024-03-28 DIAGNOSIS — O13.3 GESTATIONAL HYPERTENSION, THIRD TRIMESTER: ICD-10-CM

## 2024-03-28 PROBLEM — Z23 NEED FOR VARICELLA VACCINE: Status: ACTIVE | Noted: 2024-03-28

## 2024-03-28 PROBLEM — D56.3 THALASSEMIA ALPHA CARRIER: Status: ACTIVE | Noted: 2024-03-28

## 2024-03-28 PROBLEM — O13.9 GESTATIONAL HYPERTENSION: Status: ACTIVE | Noted: 2024-03-28

## 2024-03-28 LAB
ABO/RH(D): NORMAL
ALBUMIN SERPL BCG-MCNC: 3.5 G/DL (ref 3.5–5.2)
ALP SERPL-CCNC: 174 U/L (ref 40–150)
ALT SERPL W P-5'-P-CCNC: 9 U/L (ref 0–50)
ANION GAP SERPL CALCULATED.3IONS-SCNC: 12 MMOL/L (ref 7–15)
ANTIBODY SCREEN: NEGATIVE
AST SERPL W P-5'-P-CCNC: 28 U/L (ref 0–45)
BILIRUB SERPL-MCNC: 0.8 MG/DL
BUN SERPL-MCNC: 2.9 MG/DL (ref 6–20)
CALCIUM SERPL-MCNC: 9.5 MG/DL (ref 8.6–10)
CHLORIDE SERPL-SCNC: 94 MMOL/L (ref 98–107)
CREAT SERPL-MCNC: 0.49 MG/DL (ref 0.51–0.95)
DEPRECATED HCO3 PLAS-SCNC: 19 MMOL/L (ref 22–29)
EGFRCR SERPLBLD CKD-EPI 2021: >90 ML/MIN/1.73M2
ERYTHROCYTE [DISTWIDTH] IN BLOOD BY AUTOMATED COUNT: 14.6 % (ref 10–15)
GLUCOSE SERPL-MCNC: 142 MG/DL (ref 70–99)
HCT VFR BLD AUTO: 30 % (ref 35–47)
HGB BLD-MCNC: 10.3 G/DL (ref 11.7–15.7)
MCH RBC QN AUTO: 27.3 PG (ref 26.5–33)
MCHC RBC AUTO-ENTMCNC: 34.3 G/DL (ref 31.5–36.5)
MCV RBC AUTO: 80 FL (ref 78–100)
PLATELET # BLD AUTO: 225 10E3/UL (ref 150–450)
POTASSIUM SERPL-SCNC: 4.1 MMOL/L (ref 3.4–5.3)
PROT SERPL-MCNC: 6.3 G/DL (ref 6.4–8.3)
RBC # BLD AUTO: 3.77 10E6/UL (ref 3.8–5.2)
SODIUM SERPL-SCNC: 125 MMOL/L (ref 135–145)
SPECIMEN EXPIRATION DATE: NORMAL
WBC # BLD AUTO: 19.6 10E3/UL (ref 4–11)

## 2024-03-28 PROCEDURE — 80053 COMPREHEN METABOLIC PANEL: CPT | Performed by: MIDWIFE

## 2024-03-28 PROCEDURE — 86900 BLOOD TYPING SEROLOGIC ABO: CPT | Performed by: MIDWIFE

## 2024-03-28 PROCEDURE — 250N000011 HC RX IP 250 OP 636: Performed by: MIDWIFE

## 2024-03-28 PROCEDURE — 120N000002 HC R&B MED SURG/OB UMMC

## 2024-03-28 PROCEDURE — 85027 COMPLETE CBC AUTOMATED: CPT | Performed by: MIDWIFE

## 2024-03-28 PROCEDURE — 250N000013 HC RX MED GY IP 250 OP 250 PS 637: Performed by: MIDWIFE

## 2024-03-28 PROCEDURE — 36415 COLL VENOUS BLD VENIPUNCTURE: CPT | Performed by: MIDWIFE

## 2024-03-28 PROCEDURE — 250N000009 HC RX 250: Performed by: MIDWIFE

## 2024-03-28 PROCEDURE — 722N000001 HC LABOR CARE VAGINAL DELIVERY SINGLE

## 2024-03-28 PROCEDURE — 0DQR0ZZ REPAIR ANAL SPHINCTER, OPEN APPROACH: ICD-10-PCS | Performed by: OBSTETRICS & GYNECOLOGY

## 2024-03-28 PROCEDURE — 0UQMXZZ REPAIR VULVA, EXTERNAL APPROACH: ICD-10-PCS | Performed by: OBSTETRICS & GYNECOLOGY

## 2024-03-28 PROCEDURE — 59410 OBSTETRICAL CARE: CPT | Mod: GC | Performed by: OBSTETRICS & GYNECOLOGY

## 2024-03-28 RX ORDER — LOPERAMIDE HCL 2 MG
2 CAPSULE ORAL
Status: DISCONTINUED | OUTPATIENT
Start: 2024-03-28 | End: 2024-03-28

## 2024-03-28 RX ORDER — ONDANSETRON 4 MG/1
4 TABLET, ORALLY DISINTEGRATING ORAL EVERY 6 HOURS PRN
Status: DISCONTINUED | OUTPATIENT
Start: 2024-03-28 | End: 2024-03-28

## 2024-03-28 RX ORDER — IBUPROFEN 800 MG/1
800 TABLET, FILM COATED ORAL EVERY 6 HOURS PRN
Status: DISCONTINUED | OUTPATIENT
Start: 2024-03-28 | End: 2024-03-30 | Stop reason: HOSPADM

## 2024-03-28 RX ORDER — OXYTOCIN/0.9 % SODIUM CHLORIDE 30/500 ML
340 PLASTIC BAG, INJECTION (ML) INTRAVENOUS CONTINUOUS PRN
Status: DISCONTINUED | OUTPATIENT
Start: 2024-03-28 | End: 2024-03-28

## 2024-03-28 RX ORDER — LOPERAMIDE HCL 2 MG
2 CAPSULE ORAL
Status: DISCONTINUED | OUTPATIENT
Start: 2024-03-28 | End: 2024-03-30 | Stop reason: HOSPADM

## 2024-03-28 RX ORDER — CARBOPROST TROMETHAMINE 250 UG/ML
250 INJECTION, SOLUTION INTRAMUSCULAR
Status: DISCONTINUED | OUTPATIENT
Start: 2024-03-28 | End: 2024-03-28

## 2024-03-28 RX ORDER — ACETAMINOPHEN 325 MG/1
650 TABLET ORAL EVERY 4 HOURS PRN
Status: DISCONTINUED | OUTPATIENT
Start: 2024-03-28 | End: 2024-03-30 | Stop reason: HOSPADM

## 2024-03-28 RX ORDER — OXYTOCIN 10 [USP'U]/ML
10 INJECTION, SOLUTION INTRAMUSCULAR; INTRAVENOUS
Status: DISCONTINUED | OUTPATIENT
Start: 2024-03-28 | End: 2024-03-28

## 2024-03-28 RX ORDER — OXYTOCIN 10 [USP'U]/ML
10 INJECTION, SOLUTION INTRAMUSCULAR; INTRAVENOUS
Status: DISCONTINUED | OUTPATIENT
Start: 2024-03-28 | End: 2024-03-30 | Stop reason: HOSPADM

## 2024-03-28 RX ORDER — MISOPROSTOL 200 UG/1
800 TABLET ORAL
Status: DISCONTINUED | OUTPATIENT
Start: 2024-03-28 | End: 2024-03-30 | Stop reason: HOSPADM

## 2024-03-28 RX ORDER — PROCHLORPERAZINE MALEATE 10 MG
10 TABLET ORAL EVERY 6 HOURS PRN
Status: DISCONTINUED | OUTPATIENT
Start: 2024-03-28 | End: 2024-03-28

## 2024-03-28 RX ORDER — NALOXONE HYDROCHLORIDE 0.4 MG/ML
0.4 INJECTION, SOLUTION INTRAMUSCULAR; INTRAVENOUS; SUBCUTANEOUS
Status: DISCONTINUED | OUTPATIENT
Start: 2024-03-28 | End: 2024-03-28

## 2024-03-28 RX ORDER — METHYLERGONOVINE MALEATE 0.2 MG/ML
200 INJECTION INTRAVENOUS
Status: DISCONTINUED | OUTPATIENT
Start: 2024-03-28 | End: 2024-03-30 | Stop reason: HOSPADM

## 2024-03-28 RX ORDER — LOPERAMIDE HCL 2 MG
4 CAPSULE ORAL
Status: DISCONTINUED | OUTPATIENT
Start: 2024-03-28 | End: 2024-03-28

## 2024-03-28 RX ORDER — METHYLERGONOVINE MALEATE 0.2 MG/ML
200 INJECTION INTRAVENOUS
Status: DISCONTINUED | OUTPATIENT
Start: 2024-03-28 | End: 2024-03-28

## 2024-03-28 RX ORDER — HYDROCORTISONE 25 MG/G
CREAM TOPICAL 3 TIMES DAILY PRN
Status: DISCONTINUED | OUTPATIENT
Start: 2024-03-28 | End: 2024-03-30 | Stop reason: HOSPADM

## 2024-03-28 RX ORDER — DOCUSATE SODIUM 100 MG/1
100 CAPSULE, LIQUID FILLED ORAL 2 TIMES DAILY
Status: DISCONTINUED | OUTPATIENT
Start: 2024-03-28 | End: 2024-03-30 | Stop reason: HOSPADM

## 2024-03-28 RX ORDER — METOCLOPRAMIDE HYDROCHLORIDE 5 MG/ML
10 INJECTION INTRAMUSCULAR; INTRAVENOUS EVERY 6 HOURS PRN
Status: DISCONTINUED | OUTPATIENT
Start: 2024-03-28 | End: 2024-03-28

## 2024-03-28 RX ORDER — MODIFIED LANOLIN
OINTMENT (GRAM) TOPICAL
Status: DISCONTINUED | OUTPATIENT
Start: 2024-03-28 | End: 2024-03-30 | Stop reason: HOSPADM

## 2024-03-28 RX ORDER — MISOPROSTOL 200 UG/1
400 TABLET ORAL
Status: DISCONTINUED | OUTPATIENT
Start: 2024-03-28 | End: 2024-03-28

## 2024-03-28 RX ORDER — OXYTOCIN/0.9 % SODIUM CHLORIDE 30/500 ML
340 PLASTIC BAG, INJECTION (ML) INTRAVENOUS CONTINUOUS PRN
Status: DISCONTINUED | OUTPATIENT
Start: 2024-03-28 | End: 2024-03-30 | Stop reason: HOSPADM

## 2024-03-28 RX ORDER — OXYTOCIN/0.9 % SODIUM CHLORIDE 30/500 ML
100-340 PLASTIC BAG, INJECTION (ML) INTRAVENOUS CONTINUOUS PRN
Status: DISCONTINUED | OUTPATIENT
Start: 2024-03-28 | End: 2024-03-28

## 2024-03-28 RX ORDER — KETOROLAC TROMETHAMINE 30 MG/ML
30 INJECTION, SOLUTION INTRAMUSCULAR; INTRAVENOUS
Status: DISCONTINUED | OUTPATIENT
Start: 2024-03-28 | End: 2024-03-28

## 2024-03-28 RX ORDER — ONDANSETRON 2 MG/ML
4 INJECTION INTRAMUSCULAR; INTRAVENOUS EVERY 6 HOURS PRN
Status: DISCONTINUED | OUTPATIENT
Start: 2024-03-28 | End: 2024-03-28

## 2024-03-28 RX ORDER — FENTANYL CITRATE 50 UG/ML
100 INJECTION, SOLUTION INTRAMUSCULAR; INTRAVENOUS
Status: DISCONTINUED | OUTPATIENT
Start: 2024-03-28 | End: 2024-03-28

## 2024-03-28 RX ORDER — NALOXONE HYDROCHLORIDE 0.4 MG/ML
0.2 INJECTION, SOLUTION INTRAMUSCULAR; INTRAVENOUS; SUBCUTANEOUS
Status: DISCONTINUED | OUTPATIENT
Start: 2024-03-28 | End: 2024-03-28

## 2024-03-28 RX ORDER — LIDOCAINE 40 MG/G
CREAM TOPICAL
Status: DISCONTINUED | OUTPATIENT
Start: 2024-03-28 | End: 2024-03-28

## 2024-03-28 RX ORDER — TRANEXAMIC ACID 10 MG/ML
1 INJECTION, SOLUTION INTRAVENOUS EVERY 30 MIN PRN
Status: DISCONTINUED | OUTPATIENT
Start: 2024-03-28 | End: 2024-03-28

## 2024-03-28 RX ORDER — MISOPROSTOL 200 UG/1
400 TABLET ORAL
Status: DISCONTINUED | OUTPATIENT
Start: 2024-03-28 | End: 2024-03-30 | Stop reason: HOSPADM

## 2024-03-28 RX ORDER — PROCHLORPERAZINE 25 MG
25 SUPPOSITORY, RECTAL RECTAL EVERY 12 HOURS PRN
Status: DISCONTINUED | OUTPATIENT
Start: 2024-03-28 | End: 2024-03-28

## 2024-03-28 RX ORDER — METOCLOPRAMIDE 10 MG/1
10 TABLET ORAL EVERY 6 HOURS PRN
Status: DISCONTINUED | OUTPATIENT
Start: 2024-03-28 | End: 2024-03-28

## 2024-03-28 RX ORDER — IBUPROFEN 800 MG/1
800 TABLET, FILM COATED ORAL
Status: DISCONTINUED | OUTPATIENT
Start: 2024-03-28 | End: 2024-03-28

## 2024-03-28 RX ORDER — NIFEDIPINE 30 MG/1
30 TABLET, EXTENDED RELEASE ORAL DAILY
Status: DISCONTINUED | OUTPATIENT
Start: 2024-03-28 | End: 2024-03-30 | Stop reason: HOSPADM

## 2024-03-28 RX ORDER — OXYTOCIN 10 [USP'U]/ML
10 INJECTION, SOLUTION INTRAMUSCULAR; INTRAVENOUS
Status: COMPLETED | OUTPATIENT
Start: 2024-03-28 | End: 2024-03-28

## 2024-03-28 RX ORDER — MISOPROSTOL 200 UG/1
800 TABLET ORAL
Status: DISCONTINUED | OUTPATIENT
Start: 2024-03-28 | End: 2024-03-28

## 2024-03-28 RX ORDER — ACETAMINOPHEN 325 MG/1
650 TABLET ORAL EVERY 4 HOURS PRN
Status: DISCONTINUED | OUTPATIENT
Start: 2024-03-28 | End: 2024-03-28

## 2024-03-28 RX ORDER — TRANEXAMIC ACID 10 MG/ML
1 INJECTION, SOLUTION INTRAVENOUS EVERY 30 MIN PRN
Status: DISCONTINUED | OUTPATIENT
Start: 2024-03-28 | End: 2024-03-30 | Stop reason: HOSPADM

## 2024-03-28 RX ORDER — LOPERAMIDE HCL 2 MG
4 CAPSULE ORAL
Status: DISCONTINUED | OUTPATIENT
Start: 2024-03-28 | End: 2024-03-30 | Stop reason: HOSPADM

## 2024-03-28 RX ORDER — CITRIC ACID/SODIUM CITRATE 334-500MG
30 SOLUTION, ORAL ORAL
Status: DISCONTINUED | OUTPATIENT
Start: 2024-03-28 | End: 2024-03-28

## 2024-03-28 RX ORDER — CARBOPROST TROMETHAMINE 250 UG/ML
250 INJECTION, SOLUTION INTRAMUSCULAR
Status: DISCONTINUED | OUTPATIENT
Start: 2024-03-28 | End: 2024-03-30 | Stop reason: HOSPADM

## 2024-03-28 RX ADMIN — ACETAMINOPHEN 650 MG: 325 TABLET, FILM COATED ORAL at 12:45

## 2024-03-28 RX ADMIN — LIDOCAINE HYDROCHLORIDE 20 ML: 10 INJECTION, SOLUTION EPIDURAL; INFILTRATION; INTRACAUDAL; PERINEURAL at 10:10

## 2024-03-28 RX ADMIN — ACETAMINOPHEN 650 MG: 325 TABLET, FILM COATED ORAL at 18:10

## 2024-03-28 RX ADMIN — BENZOCAINE AND LEVOMENTHOL 1 G: 200; 5 SPRAY TOPICAL at 14:20

## 2024-03-28 RX ADMIN — DOCUSATE SODIUM 100 MG: 100 CAPSULE, LIQUID FILLED ORAL at 21:52

## 2024-03-28 RX ADMIN — OXYTOCIN 10 UNITS: 10 INJECTION INTRAVENOUS at 10:09

## 2024-03-28 RX ADMIN — IBUPROFEN 800 MG: 800 TABLET, FILM COATED ORAL at 18:11

## 2024-03-28 RX ADMIN — METHYLERGONOVINE MALEATE 200 MCG: 0.2 INJECTION INTRAVENOUS at 10:17

## 2024-03-28 RX ADMIN — IBUPROFEN 800 MG: 800 TABLET, FILM COATED ORAL at 11:15

## 2024-03-28 RX ADMIN — ACETAMINOPHEN 650 MG: 325 TABLET, FILM COATED ORAL at 22:13

## 2024-03-28 ASSESSMENT — ACTIVITIES OF DAILY LIVING (ADL)
ADLS_ACUITY_SCORE: 20

## 2024-03-28 NOTE — PROGRESS NOTES
Subjective:  Giulia Spivey is sitting up in bed following her birth this morning. Pain is being well managed with ibuprofen and dermoplast. She denies any headaches, vision changes, or RUQ pain. Pt and spouse consent to lab draw now to assess HELLP labs  verbalized understanding of treating elevated BP with oral medication  and agrees  is also agreeable to enrollment in the [facility] BP  program     Objective:  Patient Vitals for the past 24 hrs:   BP Temp Temp src Pulse Resp   03/28/24 1445 (!) 130/90 -- -- 98 --   03/28/24 1227 (!) 142/84 98.2  F (36.8  C) Oral 87 16   03/28/24 1200 132/65 98.5  F (36.9  C) Oral -- 16 03/28/24 1139 122/75 -- -- -- (P) 16 03/28/24 1124 123/76 -- -- -- (P) 16 03/28/24 1115 123/78 -- -- -- (P) 16 03/28/24 1100 134/82 -- -- -- (P) 16 03/28/24 1039 131/86 -- -- -- (P) 16 03/28/24 1024 (!) 154/87 -- -- -- (P) 16 03/28/24 1009 (!) 141/84 98.4  F (36.9  C) Oral -- (P) 16 03/28/24 0810 124/67 -- -- -- --     Assessment:  - Vacuum assisted vaginal delivery  - Gestational Hypertension    Plan:  - Collect Pre-E labs   - CBC   - CMP   - Type and screen  - Start 30mg PO Procardia XR  - Monitor blood pressures Q4 hours  - Plan for discharge to home at 36 hours   - Reevaluate tomorrow PPD1    I, YAMILET Anderson am serving as a scribe; to document services personally performed by WYATT Luna CNM based on data collection and the provider's statements to me.     YAMILET Anderson    The encounter was performed by me and scribed by the SNM. The scribed note accurately reflects my personal services and decisions made by me.     Liliam SCHNEIDER

## 2024-03-28 NOTE — H&P
"ADMIT NOTE  =================  41w6d    Giulia Spivey is a 28 year old female with an Patient's last menstrual period was 2023. and Estimated Date of Delivery: Mar 15, 2024 is [facility] on 3/28/2024 at 9:19 AM in active labor.     HPI  ================  Pt  was scheduled for postdates IOL today but presents to the birth place in active labor .  They report onset of mild contractions at 0200 and becme less than 5 min apart at 0600     Denies fever, cough, SOB or chest pain. Contractions- every 2-3 minutes  Fetal movement- active  ROM- no.  Vaginal bleeding- none  GBS- negative  FOB- is involved, Reuben   Other labor support- Laury Fajardo     Weight gain- 178 - 156 lbs, Total weight gain- 22 lbs  Height- 5'3\"  BMI- 26   First prenatal visit at 8  weeks x 10 visits at [facility] [place] , JENNIFER to S at 37 wks x 5 visits  Total visits- 15     PROBLEM LIST  =================  Patient Active Problem List    Diagnosis Date Noted    Vitamin D deficiency 2024     Priority: Medium    Normal pregnancy in third trimester 2024     Priority: Medium     [facility] Patient Provider Group choice: CNM group  Partner's name: Reuben  []NOB folder  [x]Dating by LMP  [x] NIPT neg  [x] AFP neg  [x]Fetal anatomy US   [x]Rubella immune  []Hep B immune (if nonimmune, enter dx)  []Varicella immune  []Pap  []COVID vaccine completed  _____________________________________  [x]EOB folder  []PP Contraception plan: If tubal,consent date:  [x]Labor plans: unmedicated  [x]: Laury Choe  [x]Infant feeding plan: breast  []FLU shot  [x]TDAP   []RSV  []Rhogam if needed, date:  []TOLAC consent done  [x] Water birth interest  [x]GCT, passed  ________________________________________  [] OTC PP meds sent  []PP plans:  []Planning CS-ERAS pkt           HISTORIES  ============  No Known Allergies  History reviewed. No pertinent past medical history.  Past Surgical History:   Procedure Laterality Date    " "WISDOM TOOTH EXTRACTION Bilateral    .  Family History   Problem Relation Age of Onset    Hypothyroidism Mother     No Known Problems Father     No Known Problems Brother     Diabetes Paternal Grandfather         type 2    Hyperlipidemia Paternal Grandfather     Hypertension Paternal Grandfather      Social History     Tobacco Use    Smoking status: Never    Smokeless tobacco: Never   Substance Use Topics    Alcohol use: Not Currently     OB History    Para Term  AB Living   1 0 0 0 0 0   SAB IAB Ectopic Multiple Live Births   0 0 0 0 0      # Outcome Date GA Lbr Barrie/2nd Weight Sex Delivery Anes PTL Lv   1 Current               Obstetric Comments   *First Pregnancy           LABS: on scanned prenatal records in EMR   ===========  Prenatal Labs:  Rhogam not indicated  O POS  NEG AB Screen   Lab Results   Component Value Date    HGB 11.6 (L) 2024     Rubella immune    \"GBS\" NEG 24   + alpha thalassemia carrier Propionic acidemia  jeremiah is neg   Passed 1 hr GCT  TDAp 23   Other labs:Ultrasounds:   23 Dating IUP 8w0d c/w LMP  10/19/23 Anatomy scan - WNL  24 US (size<dates) - EFW 40%ile, cephalic. LIZBETH 14. Anterior placenta.  COVID-19 PCR Results           No data to display              COVID-19 Antibody Results, Testing for Immunity           No data to display               No results found for this or any previous visit (from the past 24 hour(s)).    ROS  =========  Pt denies significant respiratory, cardiovacular, GI, or muscular/skeletalcomplaints.    See RN data base ROS.     PHYSICAL EXAM:  ===============  LMP 2023   General appearance: uncomfortable with contractions  GENERAL APPEARANCE: healthy, alert and no distress  RESP: lungs clear to auscultation - no rales, rhonchi or wheezes  CV: regular rates and rhythm, normal S1 S2, no S3 or S4 and no murmur,and no varicosities  ABDOMEN:  soft, nontender, no epigastric pain  SKIN: no suspicious lesions or rashes  NEURO: " Denies headache, blurred vision, other vision changes  PSYCH: mentation appears normal. and affect normal/bright  Legs: not assessed per pt request      Abdomen: gravid, vertex fetus per Leopold's, non-tender between contractions.   Cephalic presentation EFW-  7 1/2 lbs.   CONTRACTIONS: every 2-3 minutes  FETAL HEART TONES: continuous EFM- baseline 115-120 with moderate variability and positive accelerations.  RN reported 2 ? Audible decels on admission  decelerations.  PELVIC EXAM: 9.5/ 100%/ Anterior/ soft/ +1     BLOODY SHOW: no   ROM:no per pt  no BOW palpable with exam   FLUID: none  ROMPLUS: not done    # Pain Assessment:   - Giulia is experiencing pain due to labor planning unmedicated  . Pain management was discussed and the plan was created in a collaborative fashion.     ASSESSMENT:  ==============  G1 27 yo IUP @ 41w6d admitted in active labor   NST REACTIVE  Fetal Heart Tones - category one monitor closely   GBS- negative    Patient Active Problem List   Diagnosis    Normal pregnancy in third trimester    Vitamin D deficiency       PLAN:  ===========  Admit - see IP orders  MD consultant on call Dr Murphy updated on unit / available prn  Ambulation, hydration, position changes, birthing ball and tub options to facilitate labor reviewed with pt .  Pt is declining lab draw and IV  explained in detail in case of emergency including hemorrhage  or potential rare  need for blood   lab would need to have type and screen , CBC .    RN at bedside also discussing with pt  reasons for type and screen on admission.  They continue to decline labs at this time verbalizing understanding of our recommendations for safe care.    They have asked for some time  is agreeable to continuous EFM      WYATT Luna CNM

## 2024-03-28 NOTE — PROGRESS NOTES
S;General appearance: uncomfortable with contractions  feeling pressure and pushy at 0900 has begun to push  declined exam   Has declined labs/ saline lock    and spouse here supportive     Baseline rate 110, low  normal  Pt changing positions  difficult getting continuous tracing unless applying direct pressure to EFM    Variability moderate  Accelerations present   Decelerations not present    .    CONTRACTIONS: Contractions every 2-3 minutes.  Palpate: strong  Pitocin- none,  Antibiotics- none    ROM: not ruptured no fluid leaking   PELVIC EXAM:declined deferred pt is pushing spontaneously      Assessment: EFM interpretation suggests absence of concern for fetal metabolic acidemia at this time due to accelerations present, heart rate: normal baseline, and variability: moderate     ASSESSMENT:  ==============  Giulia Spivey  28 year old  female  Estimated Date of Delivery: Mar 15, 2024  IUP @ 41w6d second stage labor   Fetal Heart rate tracing  low normal baseline category two no decels   GBS- negative    Patient Active Problem List   Diagnosis    Normal pregnancy in third trimester    Vitamin D deficiency          PLAN:  ===========  Anticipate   MD consultant on call / available prn  Second stage pushing         WYATT Luna CNM

## 2024-03-28 NOTE — PLAN OF CARE
Data: Vital signs within normal limits with exception of elevated blood pressure. Patient stated concern of blood pressure decreasing if taking nifedipine since she already gets lightheaded. Prefers to do more frequent blood pressure monitoring and will consider taking medication if blood pressure increases again. At this time, she felt that her elevated pressures may have been due to her feeling anxious, shaky during and after delivery. Will continue to monitor closely. Postpartum checks within normal limits - see flow record. Patient eating and drinking normally. Patient able to empty bladder independently and is up ambulating. No apparent signs of infection.     healing well. Patient performing self cares and is able to care for infant.  Action: Patient medicated during the shift for pain and cramping. See MAR. Patient reassessed within 1 hour after each medication and pain was improved - patient stated she was comfortable. Patient education done about plan of care. See flow record.  Response: Positive attachment behaviors observed with infant. Support person, , Reuben, present.

## 2024-03-28 NOTE — L&D DELIVERY NOTE
OB Vaginal Delivery Note    Giulia Spivey MRN# [id number]   Age: 28 year old YOB: 1995     L&D Delivery Note:     Giulia Spivey is a 28 year old now  who presented at 41w6d in spontaneous labor.  Pregnancy was unremarkable. She received no augmentation. Labor course was complicated by non-reassuring fetal status.  She progressed to complete and began pushing with excellent maternal effort. Her fetal heart tracing was notable for fetal bradycardia with baseline of 90. Fetal station was noted to be +2. Vacuum-assistance was recommended. Risks of a vacuum including increased risk of tearing, bleeding under the scalp and into the brain for the baby, and failure requiring  section were reviewed and she agreed to proceed. Vacuum was applied and there were two pop-offs after which the head delivered. She developed a shoulder dystocia which was relieved with the posterior arm and lasted 30 seconds. She then had a vaginal delivery of a viable male infant in NETTE position. No nuchal cord was noted.  Apgars of 8 and 9 with weight of 3500 grams (7 lb 11.5 oz).  The cord was double clamped after 3 minutes and cut.  A cord segment and cord blood were obtained. The patient had no IV and IM pitocin was given. The placenta was then delivered using gentle traction and suprapubic pressure. The uterus was noted to be firm after fundal massage. The perineum was assessed for lacerations and a 3a laceration was noted.  The 3a laceration was repaired in standard fashion using 2-0 suture, after which the CNM service took over the repair.  On final examination of the perineum, the repair was noted to be hemostatic.  Total QBL was 842 mL.  The placenta appeared intact with a 3V umbilical cord.      Salima Heredia MD  Ob/Gyn Resident, PGY-3  2024 11:44 AM       GA: 41w6d  GP:   Labor Complications:    EBL:   mL  Delivery QBL: 842 mL  Delivery Type: Vaginal, Vacuum (Extractor)   ROM to Delivery Time:  rupture date or rupture time have not been documented   Weight: 3.5 kg (7 lb 11.5 oz)    1 Minute 5 Minute 10 Minute   Apgar Totals: 8   9                Nba Spivey [[id number]]      Labor Event Times      Latent labor onset date/time: 3/28/2024 0200    Active labor onset date: 3/28/24 Onset time:  8:00 AM   Dilation complete date: 3/28/24 Complete time:  9:11 AM   Start pushing date/time: 3/28/2024 0915          Labor Length      1st Stage (hrs): 1 (min): 11   2nd Stage (hrs): 0 (min): 49   3rd Stage (hrs): 0 (min): 5          Labor Events     labor?: No   steroids: None  Labor Type: Spontaneous     Antibiotics received during labor?: No     Rupture identifier: Sac 1  Rupture date/time:     Rupture type: Spontaneous Rupture of Membranes  Fluid color: Clear  Fluid odor: Normal     Augmentation: None       Delivery/Placenta Date and Time      Delivery Date: 3/28/24 Delivery Time: 10:00 AM   Placenta Date/Time: 3/28/2024 10:05 AM  Oxytocin given at the time of delivery: after delivery of placenta  Delivering clinician: Samia Snyder APRN CNAYAH              Vaginal Counts       Initial count performed by 2 team members:  Two Team Members   Mehreen Snyder         Needles Suture Needles Sponges (RETIRED) Instruments   Initial counts 2  5    Added to count  2 5    Relief counts       Final counts               Placed during labor Accounted for at the end of labor   FSE No    IUPC No    Cervidil No                              Apgars    Living status: Living   1 Minute 5 Minute 10 Minute 15 Minute 20 Minute   Skin color: 1  1       Heart rate: 2  2       Reflex irritability: 2  2       Muscle tone: 2  2       Respiratory effort: 1  2       Total: 8  9       Apgars assigned by: SARATH VAZQUEZ-STUDENT; SARATH CAMPBELL       Cord      Vessels: 3 Vessels    Cord Complications: None               Cord Blood Disposition: Lab    Gases Sent?: Yes    Delayed cord clamping?: Yes     Cord Clamping Delay (seconds):  seconds    Stem cell collection?: No            Resuscitation    Methods: None  [place] Care at Delivery: Asked by Dr. Murphy to attend the delivery of this full term, male infant with a gestational age of 41 6/7 weeks secondary to shoulder dystocia, and vacuum assist.      2 minutes and 30 seconds of delayed cord clamping were completed.  The infant was stimulated, cried and had spontaneous respirations during delayed cord clamping.  The infant was placed on a warmer, dried and stimulated.   Gross PE is WNL except for mild nasal flaring, acrocyanosis, bruising and molding secondary to vacuum. Assessed infants clavicles, bilateral upper extremities and head following should dystocia and vacuum assist, all WNL. Infant required no further resuscitation.  Infant was shown to mother and father, handoff to nursery nurse and will be transferred to the [facility] for further care.      Gisell SCHNEIDER CNP 3/28/2024 11:33 AM   SARATH Childress Student    Output in Delivery Room: Stool       [place] Measurements      Weight: 7 lb 11.5 oz    Output in delivery room: Stool       Delivery (Maternal) (Provider to Complete) ([id number])    Episiotomy: None  Perineal lacerations: 3rd Repaired?: Yes     Periurethral laceration: right    Repair suture: 2-0 Vicryl, 3-0 Vicryl  Genital tract inspection done: Pos       Blood Loss  Mother: Giulia Spivey #[id number]     Start of Mother's Information      Delivery Blood Loss  24 0800 - 24 1144      Delivery QBL (mL) Hospital Encounter 842 mL    Total  842 mL               End of Mother's Information  Mother: Giulia Spivey #[id number]                Delivery - Provider to Complete ([id number])    Delivering clinician: Samia Snyder APRN CNM  Delivery Type (Choose the 1 that will go to the Birth History): Vaginal, Vacuum (Extractor)    Verbal Informed Consent Obtained For Vacuum: Yes Alternate Labor Strategies Discussed (vacuum): Yes       Emergency Resources Available (vacuum): Charge Nurse/Team, Resuscitation Team          Position (vacuum): OA Fetal Station (vacuum): +2        Indication for Operative Vaginal Delivery (vacuum): Fetal Status     Operative Vaginal Delivery Brief Note Vacuum: The vacuum was applied to the vertex of the head. Suction was applied and there were two pop-offs after which the head delivered.                                  Placenta    Date/Time: 3/28/2024 10:05 AM  Removal: Spontaneous  Disposition: Hospital disposal             Anesthesia    Method: None                           Salima Heredia MD    Staff MD Note  I was present and scrubbed for the entire procedure noted above.  I agree with the description above and any necessary changes have been made by me.  Ronda Murphy MD

## 2024-03-28 NOTE — PLAN OF CARE
Patient arrived to [facility] unit via wheelchair with belongings, accompanied by RN and , with infant in arms. Got report from TONJA Verde and checked bands. Unit and room oriented complete. No concerns at this time. Continue with plan of care.

## 2024-03-28 NOTE — PLAN OF CARE
Data: Giulia Spivey transferred to [facility] via wheelchair at 1215. Baby transferred via parent's arms.  Action: Receiving unit notified of transfer: Yes. Patient and family notified of room change. Report given to Apolonia at 1145. Belongings sent to receiving unit. Accompanied by Registered Nurse. Oriented patient to surroundings. Call light within reach. ID bands double-checked with receiving RN.  Response: Patient tolerated transfer and is stable.

## 2024-03-28 NOTE — L&D DELIVERY NOTE
Vaginal Delivery Note   of viable Male with Samia Denny, Ronda Murphy MD, Mehreen Landon, RN in attendance.  NICU/Nursery RN Adiel Diaz present.  Infant with spontaneous cry, to mother's abdomen, dried and stimulated.  APGAR at 1 minute:  8  and APGAR at 5 minutes:  9.  Placenta delivered with out complication, , 2nd degree laceration, with repair, gisele cares provided.  Mother and baby in stable condition.

## 2024-03-28 NOTE — PROGRESS NOTES
SUBJECTIVE:  ============  General appearance: uncomfortable, feeling lots of pressure  Support: Spouse and   Labor course:  0200: contractions started  0600: contractions regular and uncomfortable  0800: Admitted to the [facility]  0830: 9/100/0  0945: Complete/+2  FHR noted deceleration to 90 BPM see documentation below   0950: MD at bedside    OBJECTIVE:  ===========    CONTINUOUS FETAL MONITORING:  Baseline rate 105-110, low normal baseline , Variability moderate, Accelerations not present, Decelerations not present    The interventions currently taken to improve the fetal heart rate tracing and fetal oxygenation are:     [id number]: Irregular and infrequent pushing with contractions. Giulia Spivey encouraged to give stronger, longer, and more regular pushes to help birth her son. Fetal baseline 105-110. No decels. Moderate variability. MD Murphy  on unit 0935 aware of pt progress updated low baseline without decels  pt progressing     0945: Fetal heart tones audibly in the 80's-90's. Explained concerns with fetal tracing with Giulia Spivey, her spouse, and her . Declined fetal scalp electrode. RN and SNM at bedside continuously. CNM out to notify MD at desk of bradycardia in 90s difficult to trace  with EFM  pt and spouse  declining IFSE<   changing positions pushing   Moved to the bed  in left lateral position. Giulia Spivey was complete and +2 and began to push with good maternal effort. Dr. Murphy consulted to come to bedside and discuss vacuum assisted birth. Giulia Spivey agreed to vacuum assistance. Dr. Murphy and OB resident at bedside   NICU team called to attend birth   Vtx visible at perineum pt making progress with pushing     maternal positioning, increase frequency of assessment, consult Category 2 algorithm, sterile vaginal exam, emotional support, and consulted MD    CONTRACTIONS:   Contractions every 2 minutes.    Palpate: strong  Pitocin: none      ROM: not ruptured    PELVIC EXAM:  PELVIC EXAM: 10/+2    Coping/Pain: Giulia Spivey   Antibiotics- none      ASSESSMENT:  ==============  Giulia Spivey  28 year old  female  Estimated Date of Delivery: Mar 15, 2024  IUP @ 41w6d active labor and pushing  Fetal Heart rate tracing  category two over the last 60 minutes  Assessment: EFM interpretation suggests concern for fetal metabolic acidemia at this time due to heart rate: bradycardia  GBS- negative    Patient Active Problem List   Diagnosis    Normal pregnancy in third trimester    Vitamin D deficiency     PLAN:  ===========  - Close observation maternal positioning, increase frequency of assessment, consult Category 2 algorithm, sterile vaginal exam, emotional support, and consulted MD  - Continue pushing   - Anticipate   - MD on call,available on unit  Dr. Murphy consulted came to bedside urgently to  Prepare for vacuum assisted birth  See MD documentation   - Per the category II algorithm, the proceed to  section or operative vaginal birth .     I, YAMILET Anderson am serving as a scribe; to document services personally performed by WYATT Luna CNM based on data collection and the provider's statements to me.     YAMILET Anderson    The encounter was performed by me and scribed by the SNM. The scribed note accurately reflects my personal services and decisions made by me.   Liliam SCHNEIDER

## 2024-03-28 NOTE — PROGRESS NOTES
See MD documentation of delivery   CNM asked to complete the 2nd degree laceration following repair of 3a lac by MD team  CNM Delivery Note   2nd degree  laceration repaired with 3-0 vicryl  suture  in the usual fashion.    Pt given  methergine 0.2mg IM prior to repair due to some ongoing trickling  FF with massage.  Repair Hemostasis notedafter completion       Mother and infant stable, continued skin to skin. QBL 625cc noted in drape following birth and repair of lac. WYATT LunaM

## 2024-03-28 NOTE — PLAN OF CARE
Nursing requested  to place an IV, pt declined.  Nursing educated pt on the importance of a lab draw in case of emergency blood needed, pt. Declined.  Pt requested that nursing leave and allow them to labor independently with .  Nursing was attempting to keep baby on the monitor and pt requested that she be able to move freely and be left alone.

## 2024-03-29 PROBLEM — D62 ANEMIA DUE TO BLOOD LOSS, ACUTE: Status: ACTIVE | Noted: 2024-03-29

## 2024-03-29 LAB
ALBUMIN MFR UR ELPH: 9.1 MG/DL
ALBUMIN SERPL BCG-MCNC: 3.1 G/DL (ref 3.5–5.2)
ALP SERPL-CCNC: 138 U/L (ref 40–150)
ALT SERPL W P-5'-P-CCNC: 8 U/L (ref 0–50)
ANION GAP SERPL CALCULATED.3IONS-SCNC: 9 MMOL/L (ref 7–15)
AST SERPL W P-5'-P-CCNC: 27 U/L (ref 0–45)
BILIRUB SERPL-MCNC: 0.5 MG/DL
BUN SERPL-MCNC: 3 MG/DL (ref 6–20)
CALCIUM SERPL-MCNC: 8.5 MG/DL (ref 8.6–10)
CHLORIDE SERPL-SCNC: 93 MMOL/L (ref 98–107)
CREAT SERPL-MCNC: 0.52 MG/DL (ref 0.51–0.95)
CREAT UR-MCNC: 15.6 MG/DL
DEPRECATED HCO3 PLAS-SCNC: 21 MMOL/L (ref 22–29)
EGFRCR SERPLBLD CKD-EPI 2021: >90 ML/MIN/1.73M2
ERYTHROCYTE [DISTWIDTH] IN BLOOD BY AUTOMATED COUNT: 14.6 % (ref 10–15)
GLUCOSE SERPL-MCNC: 98 MG/DL (ref 70–99)
HCT VFR BLD AUTO: 24.3 % (ref 35–47)
HGB BLD-MCNC: 8.4 G/DL (ref 11.7–15.7)
MCH RBC QN AUTO: 27.3 PG (ref 26.5–33)
MCHC RBC AUTO-ENTMCNC: 34.6 G/DL (ref 31.5–36.5)
MCV RBC AUTO: 79 FL (ref 78–100)
OSMOLALITY SERPL: 252 MMOL/KG (ref 275–295)
OSMOLALITY UR: 52 MMOL/KG (ref 100–1200)
PLATELET # BLD AUTO: 190 10E3/UL (ref 150–450)
POTASSIUM SERPL-SCNC: 4.1 MMOL/L (ref 3.4–5.3)
PROT SERPL-MCNC: 5.4 G/DL (ref 6.4–8.3)
PROT/CREAT 24H UR: 0.58 MG/MG CR (ref 0–0.2)
RBC # BLD AUTO: 3.08 10E6/UL (ref 3.8–5.2)
SODIUM SERPL-SCNC: 123 MMOL/L (ref 135–145)
SODIUM SERPL-SCNC: 124 MMOL/L (ref 135–145)
SODIUM SERPL-SCNC: 130 MMOL/L (ref 135–145)
SODIUM SERPL-SCNC: 130 MMOL/L (ref 135–145)
SODIUM SERPL-SCNC: 134 MMOL/L (ref 135–145)
SODIUM UR-SCNC: 20 MMOL/L
WBC # BLD AUTO: 17.5 10E3/UL (ref 4–11)

## 2024-03-29 PROCEDURE — 84156 ASSAY OF PROTEIN URINE: CPT | Performed by: MIDWIFE

## 2024-03-29 PROCEDURE — 85027 COMPLETE CBC AUTOMATED: CPT | Performed by: ADVANCED PRACTICE MIDWIFE

## 2024-03-29 PROCEDURE — 83935 ASSAY OF URINE OSMOLALITY: CPT

## 2024-03-29 PROCEDURE — 99233 SBSQ HOSP IP/OBS HIGH 50: CPT | Mod: GC

## 2024-03-29 PROCEDURE — 36415 COLL VENOUS BLD VENIPUNCTURE: CPT | Performed by: FAMILY MEDICINE

## 2024-03-29 PROCEDURE — 258N000003 HC RX IP 258 OP 636

## 2024-03-29 PROCEDURE — 120N000002 HC R&B MED SURG/OB UMMC

## 2024-03-29 PROCEDURE — 84300 ASSAY OF URINE SODIUM: CPT

## 2024-03-29 PROCEDURE — 250N000013 HC RX MED GY IP 250 OP 250 PS 637: Performed by: MIDWIFE

## 2024-03-29 PROCEDURE — 80053 COMPREHEN METABOLIC PANEL: CPT | Performed by: ADVANCED PRACTICE MIDWIFE

## 2024-03-29 PROCEDURE — 83930 ASSAY OF BLOOD OSMOLALITY: CPT

## 2024-03-29 PROCEDURE — 36415 COLL VENOUS BLD VENIPUNCTURE: CPT | Performed by: ADVANCED PRACTICE MIDWIFE

## 2024-03-29 PROCEDURE — 84295 ASSAY OF SERUM SODIUM: CPT | Performed by: FAMILY MEDICINE

## 2024-03-29 PROCEDURE — 36415 COLL VENOUS BLD VENIPUNCTURE: CPT

## 2024-03-29 PROCEDURE — 84295 ASSAY OF SERUM SODIUM: CPT

## 2024-03-29 RX ORDER — SODIUM CHLORIDE 9 MG/ML
INJECTION, SOLUTION INTRAVENOUS CONTINUOUS
Status: ACTIVE | OUTPATIENT
Start: 2024-03-29 | End: 2024-03-29

## 2024-03-29 RX ORDER — SODIUM CHLORIDE 9 MG/ML
INJECTION, SOLUTION INTRAVENOUS
Status: DISPENSED
Start: 2024-03-29 | End: 2024-03-29

## 2024-03-29 RX ADMIN — ACETAMINOPHEN 650 MG: 325 TABLET, FILM COATED ORAL at 17:13

## 2024-03-29 RX ADMIN — ACETAMINOPHEN 650 MG: 325 TABLET, FILM COATED ORAL at 22:31

## 2024-03-29 RX ADMIN — ACETAMINOPHEN 650 MG: 325 TABLET, FILM COATED ORAL at 08:08

## 2024-03-29 RX ADMIN — ACETAMINOPHEN 650 MG: 325 TABLET, FILM COATED ORAL at 04:04

## 2024-03-29 RX ADMIN — IBUPROFEN 800 MG: 800 TABLET, FILM COATED ORAL at 07:17

## 2024-03-29 RX ADMIN — IBUPROFEN 800 MG: 800 TABLET, FILM COATED ORAL at 00:21

## 2024-03-29 RX ADMIN — DOCUSATE SODIUM 100 MG: 100 CAPSULE, LIQUID FILLED ORAL at 08:08

## 2024-03-29 RX ADMIN — ACETAMINOPHEN 650 MG: 325 TABLET, FILM COATED ORAL at 12:43

## 2024-03-29 RX ADMIN — SODIUM CHLORIDE: 9 INJECTION, SOLUTION INTRAVENOUS at 05:15

## 2024-03-29 RX ADMIN — IBUPROFEN 800 MG: 800 TABLET, FILM COATED ORAL at 12:43

## 2024-03-29 RX ADMIN — DOCUSATE SODIUM 100 MG: 100 CAPSULE, LIQUID FILLED ORAL at 19:20

## 2024-03-29 RX ADMIN — SODIUM CHLORIDE 500 ML: 9 INJECTION, SOLUTION INTRAVENOUS at 03:13

## 2024-03-29 RX ADMIN — IBUPROFEN 800 MG: 800 TABLET, FILM COATED ORAL at 19:20

## 2024-03-29 ASSESSMENT — ACTIVITIES OF DAILY LIVING (ADL)
ADLS_ACUITY_SCORE: 20

## 2024-03-29 NOTE — PLAN OF CARE
Problem: Postpartum (Vaginal Delivery)  Goal: Absence of Infection Signs and Symptoms  Outcome: Progressing    Patient declined Nifedipine, requested for frequent BP monitoring. Patient's sodium level was low at 125, CNM notified, ordered repeat CMP and CBC STAT and suggested to limit water intake in the meantime, as long as patient is not overhydrating, patient aware as CNM and MD talked with them.Sodium level was at 123, family practice and CNM came to unit to discuss plan of care. Patient ok for insertion of IV, normal saline started at 250 ml/hr as ordered. Repeat Na was 124, CNM and MD notified, normal saline to run at 250 ml/ hr to stop at 0700. Fundus firm without massage at 1 cm below level of umbilicus, scant lochia noted. Patient ambulates in room, voids freely and breastfeeding infant per demand, infant cluster feeding. I and O. Monitored. Continue with plan of care.

## 2024-03-29 NOTE — PROGRESS NOTES
Medicine Consult Progress Note    0000 Fs=916  0400 Yg=180  [id number] 1000ml NS   0800 Vm=214    Hyponatremia most likely related to hypovolemia (acute) however can not rule out chronic because no baseline sodium level. Ideally correction would be <8 in 24 hours (in case it's chronic), however if patient remains asymptomatic, can continue to monitor, especially since no treatments currently being provided.  - Continue q4h sodium check, next check at 1200  - if sodium continues to increase, recommend ordering neuro checks  - if sodium stable, decrease frequency to q8h sodium checks  - tomorrow AM sodium check  - 1 week clinic visit for Blood Pressure and BMP    -Juanita Lantigua MD

## 2024-03-29 NOTE — PLAN OF CARE
Data: Vital signs within normal limits. Postpartum checks within normal limits - see flow record. Patient eating and drinking normally. Patient able to empty bladder independently and is up ambulating. No apparent signs of infection.     healing well. Patient performing self cares and is able to care for infant.  Action: Patient medicated during the shift for pain and cramping. See MAR. Patient reassessed within 1 hour after each medication and pain was improved - patient stated she was comfortable. Patient education done about plan of care. See flow record.  Response: Positive attachment behaviors observed with infant. Support person, , Reuben, present.

## 2024-03-29 NOTE — PROVIDER NOTIFICATION
03/29/24 0100   Provider Notification   Provider Name/Title Cirilo NIELSEN CNM   Method of Notification Electronic Page   Request Evaluate-Remote   Notification Reason Lab Results     Pt's Lab result is in and Sodium is 123.

## 2024-03-29 NOTE — PROGRESS NOTES
"Giulia Spivey      MRN#: [id number]  Age: 28 year old      YOB: 1995      PPD #1 S/P   Patient feels well and is without issue, baby is rooming in  Breast feeding status:initiated, baby able to latch with visible swallowing. Reports tender nipples, using lanolin.  Complications since 2 hours post delivery: PP Hypertension, hyponatremia  Patient is tolerating regular diet,  tolerating acitivity well, voiding without difficulty, cramping is minimal, lochia is decreasing, denies clots.  Perineal pain is is relieved by Ibuprophen, tylenol, ice pack and dermoplast.  The perineal laceration is well approximated. Has not yet had BM.       Giulia reports feeling well, Reuben present and supportive, rooming with baby boy \"Jewel\", doing well. Debriefed birth with family, she is still feeling overwhelmed by everything that happened and that it all happened very quickly. She denies severe pain and is bonding well with baby.    SIGNIFICANT PROBLEMS:  Patient Active Problem List    Diagnosis Date Noted    Thalassemia alpha carrier 2024     Priority: Medium      is not a carrier      Need for varicella vaccine 2024     Priority: Medium     Varicella nonimmune      Vacuum-assisted vaginal delivery 2024     Priority: Medium    Gestational hypertension 2024     Priority: Medium    Vitamin D deficiency 2024     Priority: Medium    Normal pregnancy in third trimester 2024     Priority: Medium     [facility] Patient Provider Group choice: CNM group  Partner's name: Reuben  []NOB folder  [x]Dating by LMP  [x] NIPT neg  [x] AFP neg  [x]Fetal anatomy US   [x]Rubella immune  []Hep B immune (if nonimmune, enter dx)  []Varicella NOT immune  []Pap  []COVID vaccine completed  _____________________________________  [x]EOB folder  []PP Contraception plan: If tubal,consent date:  [x]Labor plans: unmedicated  [x]: Laury Choe  [x]Infant feeding plan: " "breast  []FLU shot  [x]TDAP   []RSV  []Rhogam if needed, date:  []TOLAC consent done  [x] Water birth interest  [x]GCT, passed  ________________________________________  [x] OTC PP meds sent  []PP plans:  []Planning CS-ERAS pkt           PE:    Vitals:    03/28/24 2216 03/29/24 0058 03/29/24 0702 03/29/24 0823   BP: 127/86 136/88 136/83    BP Location: Left arm Right arm Right arm    Patient Position: Semi-Minor's Semi-Minor's Semi-Minor's    Cuff Size: Adult Regular Adult Regular Adult Regular    Pulse: 84 89 101    Resp: 16 16 16    Temp: 97.9  F (36.6  C)      TempSrc: Oral      Weight:    74.8 kg (164 lb 14.4 oz)   Patient Vitals for the past 24 hrs:   BP Temp Temp src Pulse Resp Weight   03/29/24 1437 124/72 98  F (36.7  C) Oral 104 16 --   03/29/24 1042 120/67 98.2  F (36.8  C) Oral 90 16 --   03/29/24 0823 -- -- -- -- -- 74.8 kg (164 lb 14.4 oz)   03/29/24 0702 136/83 -- -- 101 16 --   03/29/24 0058 136/88 -- -- 89 16 --   03/28/24 2216 127/86 97.9  F (36.6  C) Oral 84 16 --   03/28/24 2055 129/77 -- -- 89 -- --   03/28/24 1811 137/84 -- -- 86 -- --       NAD  Caridac- Regular rate and rhythm  Lungs- CTA bilat  Breasts: Soft, filling  Nipples: Intact, Non-tender  Abdomen: Soft, Non-tender      Uterus: Fundus Firm, Non-tender, located 1cm below the umbilicus   Lochia: Rubra, appropriate amount    Perineum: 3rd degree laceration repaired, Well-approximated, healing well  Lower Extremities: scant Edema Bilateral    Postpartum hemoglobin    Recent Labs   Lab 03/29/24  0020 03/28/24  1554   HGB 8.4* 10.3*     Blood type No results found for: \"ABO\"  No results found for: \"RH\"  Rubella status - immune      Assessment/Plan-   Stable Post-partum day #1  Complications:   - Vacuum assisted vaginal birth   - 3rd degree laceration   - Pregnancy induced Hypertension   - Postpartum hemorrhage    - Postpartum hyponatremia    - vaccination status - varicella nonimmune  Breastfeeding  Rhophylac not indicated    Teaching " done: D/C Instructions: Nutrition/Activity, Warning Signs/When to Call: Excessive Bleeding, Infection, PP Depression, RTC Clinic for PP Appointment, and PNV    Postpartum warning s/s reviewed, including bleeding/clots, fever, mastitis, or depression    Birthcontrol planned: not discussed  Current Discharge Medication List        CONTINUE these medications which have NOT CHANGED    Details   omega 3 1000 MG CAPS Take 715 mg by mouth      Prenatal Vit-Fe Fumarate-FA (PRENATAL MULTIVITAMIN  PLUS IRON) 27-1 MG TABS Take by mouth daily      VITAMIN D PO       diphenhydrAMINE (BENADRYL) 25 MG tablet Take 25 mg by mouth every 6 hours as needed for itching or allergies             - Routine Postpartum Management  - Family medicine consulted for management of hyponatremia, trending up. See MD consult note. IV in place, fluids completed this morning, continue to assess sodium levels. Patient agreeable to stay another night.  Belen will track lab values and round on patient tomorrow as well  - Reviewed hypertension following delivery, patient still declining medication but voices understanding of risks and benefits and is agreeable to frequent monitoring in hospital and home monitoring. Discussed HOPE BP program. Reviewed warning signs of preeclampsia and normal/severe BP ranges.   - Accepting of PP home visit to assess BP and support breastfeeding.   - Plan Varicella vaccination prior to discharge, ordered.  - Encouraged Postpartum videos before discharge  - Still needs iron supplementation plan and discuss birth control plan   - Anticipate discharge to home tomorrow  - RTC  2 weeks and 6 weeks    Christine CHAPIN, am serving as a scribe; to document services personally performed by  Cindy Floyd-PRANAY Calloway based on data collection and the provider's statements to me.     YAMILET Darby  I agree with the PFSH and ROS as completed by the student, except for changes made by me. The remainder of the encounter scribed by  the student. The scribed note accurately reflects my personal services and decisions made by me.  Cindy Calloway, STUART, CNM, APRN

## 2024-03-29 NOTE — CONSULTS
"[facility]  Consult Note - [facility]  Date of Admission:  3/28/2024  Consult Requested by: Cirilo Werner  Reason for Consult: Hyponatremia    Assessment & Plan   Giulia Spivey is a 28 year old female admitted on 3/28/2024. She is a healthy 29yo  admitted for labor and delivery. Found to be hyponatremic to 123.    # Hyponatremia  Na 123, serum osm 252, urine Na 20,  and urine osmolality pending. Most likely etiology is volume loss secondary to blood loss during delivery. Patient had a QBL of 842mL, she had declined an IV during labor and postpartum course and has not received IV fluids.  Likely hyponatremia secondary to body's response of retaining free water due to volume down status from blood loss and no fluid replacement.  Reassuringly, patient is asymptomatic--no confusion, lethargy, signs of encephalopathy. No prior Na in the chart.  Not on any medications that should affect sodium.  - 1L NS over 4 hours  - q4h Na checks  - Goal to increase Na by 6 units in 24 hours       Clinically Significant Risk Factors Present on Admission         # Hyponatremia: Lowest Na = 123 mmol/L in last 2 days, will monitor as appropriate   # Hypercalcemia: corrected calcium is >10.1, will monitor as appropriate    # Hypoalbuminemia: Lowest albumin = 3.1 g/dL at 3/29/2024 12:20 AM, will monitor as appropriate     # Hypertension: Noted on problem list      # Overweight: Estimated body mass index is 28.41 kg/m  as calculated from the following:    Height as of 3/22/24: 1.62 m (5' 3.78\").    Weight as of this encounter: 74.6 kg (164 lb 6.4 oz).              MD [facility]  Securely message with [employer] (more info)  Text page via [facility] Paging/Directory   See signed in provider for up to date coverage information  ______________________________________________________________________    Chief Complaint "   Hyponatremia    History is obtained from the patient    History of Present Illness   Giulia Spivey is a 28 year old female who presented to birth [facility] for     Labor course significant for vacuum delivery, QBL 842mL, declined IV/2fluids/labs during labor and postpartum course.  Patient then had elevated blood pressures x 2 for which pre-E labs were drawn and sodium returned low. Currently feeling well.  No confusion or altered mental status.  Tired due to caring for her  but no lethargy.  No prior history of hyponatremia.  Has been eating and drinking normally since delivery earlier today.      Past Medical History    History reviewed. No pertinent past medical history.    Past Surgical History   Past Surgical History:   Procedure Laterality Date    WISDOM TOOTH EXTRACTION Bilateral        Medications   Current Facility-Administered Medications   Medication    acetaminophen (TYLENOL) tablet 650 mg    benzocaine-menthol (DERMOPLAST) topical spray    carboprost (HEMABATE) injection 250 mcg    And    loperamide (IMODIUM) capsule 4 mg    docusate sodium (COLACE) capsule 100 mg    hydrocortisone (Perianal) (ANUSOL-HC) 2.5 % cream    ibuprofen (ADVIL/MOTRIN) tablet 800 mg    lanolin cream    loperamide (IMODIUM) capsule 2 mg    methylergonovine (METHERGINE) injection 200 mcg    misoprostol (CYTOTEC) tablet 400 mcg    Or    misoprostol (CYTOTEC) tablet 800 mcg    NIFEdipine ER OSMOTIC (PROCARDIA XL) 24 hr tablet 30 mg    No MMR Needed - Assessment: Patient does not need MMR vaccine    No Tdap Needed - Assessment: Patient does not need Tdap vaccine    oxytocin (PITOCIN) 30 units in 500 mL 0.9% NaCl infusion    oxytocin (PITOCIN) injection 10 Units    sodium chloride 0.9 % infusion    sodium chloride 0.9 % infusion    sodium chloride 0.9% BOLUS 500 mL    tranexamic acid 1 g in 100 mL NS IV bag (premix)             Physical Exam   Vital Signs: Temp: 97.9  F (36.6  C) Temp src: Oral BP: 127/86 Pulse: 84   Resp:  16   O2 Device: None (Room air)    Weight: 164 lbs 6.4 oz    Constitutional: awake, alert, cooperative, no apparent distress, and appears stated age  Respiratory: No increased work of breathing  Musculoskeletal: There is no redness, warmth, or swelling of the joints  Neurologic: Awake, alert, oriented to name, place and time.    Neuropsychiatric: General: normal, calm, and normal eye contact  Level of consciousness: alert / normal  Affect: normal  Orientation: oriented to self, place, time and situation  Memory and insight: normal, memory for past and recent events intact, and thought process normal    Medical Decision Making             Data     I have personally reviewed the following data over the past 24 hrs:    17.5 (H)  \   8.4 (L)   / 190     123 (L) 93 (L) 3.0 (L) /  98   4.1 21 (L) 0.52 \     ALT: 8 AST: 27 AP: 138 TBILI: 0.5   ALB: 3.1 (L) TOT PROTEIN: 5.4 (L) LIPASE: N/A       Imaging results reviewed over the past 24 hrs:   No results found for this or any previous visit (from the past 24 hour(s)).

## 2024-03-29 NOTE — PROGRESS NOTES
Late entry d/t patient care.    Pt is s/p VAVD 3/28 at 1000. QBL 842ml. Pt declined IV placement and labs on admission. Received IM pit and IM methergine for uterotonics.     Postpartum patient met hypertension criteria with 2 elevated BPs 4 hours apart. 30mg nifedipine was ordered and recommended. Pt declined, was not given.  HELLP labs ordered. AST, ALT, CBC with plts WNL.    Reviewed CMP. Sodium noted to be 125.     Pt is feeling well. Denies confusion, dizziness. Vital signs stable.     Consulted with Dr. Esquivel. Repeat CMP and CBC with plts ordered. Hbg 8.4, Sodium 123.  Family Practice IP consult placed. Consulted with Dr. Denis and Dr. Martinez.   Labs ordered by FP MDs.    To patient bedside with Dr. Denis and Dr. Martinez to review plan of care with pt and partner.  IV to be started. Labs ordered. Plan for IV replacement, q4 hour serum sodium. Next at 0400.  FP managing hyponatremia.     Has not had another elevated BP. Will recommend anti-hypertensive medication if indicated.     Continue close monitoring, collaborative care with FP.    WYATT Agustin, PRANAY

## 2024-03-30 VITALS
TEMPERATURE: 98 F | BODY MASS INDEX: 28.5 KG/M2 | DIASTOLIC BLOOD PRESSURE: 93 MMHG | SYSTOLIC BLOOD PRESSURE: 136 MMHG | RESPIRATION RATE: 17 BRPM | WEIGHT: 164.9 LBS | HEART RATE: 108 BPM

## 2024-03-30 DIAGNOSIS — O13.3 GESTATIONAL HYPERTENSION, THIRD TRIMESTER: ICD-10-CM

## 2024-03-30 DIAGNOSIS — Z37.9 VACUUM-ASSISTED VAGINAL DELIVERY: ICD-10-CM

## 2024-03-30 LAB
ANION GAP SERPL CALCULATED.3IONS-SCNC: 9 MMOL/L (ref 7–15)
BUN SERPL-MCNC: 4.5 MG/DL (ref 6–20)
CALCIUM SERPL-MCNC: 8.5 MG/DL (ref 8.6–10)
CHLORIDE SERPL-SCNC: 104 MMOL/L (ref 98–107)
CREAT SERPL-MCNC: 0.57 MG/DL (ref 0.51–0.95)
DEPRECATED HCO3 PLAS-SCNC: 22 MMOL/L (ref 22–29)
EGFRCR SERPLBLD CKD-EPI 2021: >90 ML/MIN/1.73M2
GLUCOSE SERPL-MCNC: 75 MG/DL (ref 70–99)
POTASSIUM SERPL-SCNC: 4.2 MMOL/L (ref 3.4–5.3)
SODIUM SERPL-SCNC: 135 MMOL/L (ref 135–145)

## 2024-03-30 PROCEDURE — 36415 COLL VENOUS BLD VENIPUNCTURE: CPT | Performed by: FAMILY MEDICINE

## 2024-03-30 PROCEDURE — 250N000013 HC RX MED GY IP 250 OP 250 PS 637: Performed by: MIDWIFE

## 2024-03-30 PROCEDURE — 80048 BASIC METABOLIC PNL TOTAL CA: CPT | Performed by: FAMILY MEDICINE

## 2024-03-30 RX ORDER — NIFEDIPINE 30 MG
30 TABLET, EXTENDED RELEASE ORAL DAILY
Qty: 30 TABLET | Refills: 2 | Status: SHIPPED | OUTPATIENT
Start: 2024-03-30 | End: 2024-03-30

## 2024-03-30 RX ORDER — NIFEDIPINE 30 MG
30 TABLET, EXTENDED RELEASE ORAL DAILY
Qty: 30 TABLET | Refills: 2 | Status: SHIPPED | OUTPATIENT
Start: 2024-03-30

## 2024-03-30 RX ADMIN — ACETAMINOPHEN 650 MG: 325 TABLET, FILM COATED ORAL at 06:49

## 2024-03-30 RX ADMIN — ACETAMINOPHEN 650 MG: 325 TABLET, FILM COATED ORAL at 02:25

## 2024-03-30 RX ADMIN — DOCUSATE SODIUM 100 MG: 100 CAPSULE, LIQUID FILLED ORAL at 08:19

## 2024-03-30 RX ADMIN — IBUPROFEN 800 MG: 800 TABLET, FILM COATED ORAL at 01:33

## 2024-03-30 RX ADMIN — IBUPROFEN 800 MG: 800 TABLET, FILM COATED ORAL at 08:19

## 2024-03-30 RX ADMIN — NIFEDIPINE 30 MG: 30 TABLET, FILM COATED, EXTENDED RELEASE ORAL at 12:36

## 2024-03-30 ASSESSMENT — ACTIVITIES OF DAILY LIVING (ADL)
ADLS_ACUITY_SCORE: 20

## 2024-03-30 NOTE — PLAN OF CARE
Goal Outcome Evaluation:  VSS and postpartum assessments WDL.  Up ad goldie with steady gait and independent with cares.  Bonding well with infant.  Breastfeeding on cue independently. Baby cluster feeding overnight.  Pain managed with tylenol, ibuprofen, and ice packs.  Reuben present and supportive.  Will continue with postpartum cares and education per plan of care.

## 2024-03-30 NOTE — PROVIDER NOTIFICATION
03/29/24 2235   Provider Notification   Provider Name/Title Joyce Hardin CNM   Method of Notification Electronic Page   Request Evaluate-Remote   Notification Reason Lab Results     SHAYLA Platt's latest sodium level 134. Vitals are normal and she is asymptomatic. It looks like the next draw is ordered for 0630.

## 2024-03-30 NOTE — DISCHARGE INSTRUCTIONS
Warning Signs after Having a Baby    Keep this paper on your fridge or somewhere else where you can see it.    Call your provider if you have any of these symptoms up to 12 weeks after having your baby.    Thoughts of hurting yourself or your baby  Pain in your chest or trouble breathing  Severe headache not helped by pain medicine  Eyesight concerns (blurry vision, seeing spots or flashes of light, other changes to eyesight)  Fainting, shaking or other signs of a seizure    Call 9-1-1 if you feel that it is an emergency.     The symptoms below can happen to anyone after giving birth. They can be very serious. Call your provider if you have any of these warning signs.    My provider s phone number: [facility] [phone]-Midwives    Losing too much blood (hemorrhage)    Call your provider if you soak through a pad in less than an hour or pass blood clots bigger than a golf ball. These may be signs that you are bleeding too much.    Blood clots in the legs or lungs    After you give birth, your body naturally clots its blood to help prevent blood loss. Sometimes this increased clotting can happen in other areas of the body, like the legs or lungs. This can block your blood flow and be very dangerous.     Call your provider if you:  Have a red, swollen spot on the back of your leg that is warm or painful when you touch it.   Are coughing up blood.     Infection    Call your provider if you have any of these symptoms:  Fever of 100.4 F (38 C) or higher.  Pain or redness around your stitches if you had an incision.   Any yellow, white, or green fluid coming from places where you had stitches or surgery.    Mood Problems (postpartum depression)    Many people feel sad or have mood changes after having a baby. But for some people, these mood swings are worse.     Call your provider right away if you feel so anxious or nervous that you can't care for yourself or your baby.    Preeclampsia (high blood  pressure)    Even if you didn't have high blood pressure when you were pregnant, you are at risk for the high blood pressure disease called preeclampsia. This risk can last up to 12 weeks after giving birth.     Call your provider if you have:   Pain on your right side under your rib cage  Sudden swelling in the hands and face    Remember: You know your body. If something doesn't feel right, get medical help.     For informational purposes only. Not to replace the advice of your health care provider. Copyright 2020 [facility]. All rights reserved. Clinically reviewed by Belén Herr, RNC-OB, MSN. [employer] 586379 - Rev 02/23.

## 2024-03-30 NOTE — PROGRESS NOTES
Medicine consult note    March 30, 2024    Seem is a 28 yr old female who is PPD2 today. We were consulted regarding patient's hyponatremia which has now resolved. Patient overall doing well.     Hyponatremia, resolved  Patient is being monitored for hyponatremia, likely due to hypovolemia.  However, sodium levels have now returned to normal.  Patient overall doing well.  Anticipating discharge today.    Elevated blood pressure:   Patient in discussion with primary team on starting pharmacotherapy vs monitoring for blood pressure management.     We will sign off at this time. Discussed plan with Cindy SCHNEIDER CNM.      Most Recent 3 BMP's:  Recent Labs   Lab Test 03/30/24  0742 03/29/24  2105 03/29/24  1207 03/29/24  0403 03/29/24  0020 03/28/24  1554    134* 130*   < > 123* 125*   POTASSIUM 4.2  --   --   --  4.1 4.1   CHLORIDE 104  --   --   --  93* 94*   CO2 22  --   --   --  21* 19*   BUN 4.5*  --   --   --  3.0* 2.9*   CR 0.57  --   --   --  0.52 0.49*   ANIONGAP 9  --   --   --  9 12   NIGEL 8.5*  --   --   --  8.5* 9.5   GLC 75  --   --   --  98 142*    < > = values in this interval not displayed.       Moni Luevano MD

## 2024-03-30 NOTE — PLAN OF CARE
Problem: Adult Inpatient Plan of Care  Goal: Readiness for Transition of Care  Outcome: Met     Problem: Hypertensive Disorders in Pregnancy  Goal: Patient-Fetal Stabilization  Outcome: Met  Intervention: Monitor and Manage Symptom Progression  Recent Flowsheet Documentation  Taken 3/30/2024 3668 by Zoraida Knox, RN  Medication Review/Management: (patient declining medicaitons)   high-risk medications identified   other (see comments)   Goal Outcome Evaluation:      Plan of Care Reviewed With: patient    Overall Patient Progress: improvingOverall Patient Progress: improving  Met outcomes for discharge to home. BP's elevated, medication initiated, handout given. Pain managed with as needed medications. Reviewed discharge instructions with patient, able to verbalize and demonstrate understanding of how and when to use blood pressure cuff.

## 2024-03-30 NOTE — DISCHARGE SUMMARY
[facility] Discharge Summary    Giulia Spivey MRN# [id number]   Age: 28 year old YOB: 1995     Date of Admission:  3/28/2024  Date of Discharge::  3/30/2024  Admitting Physician:  WYATT Luna CNM  Discharge Physician:  WYATT Coates CNM, CNAYAH, MS      Home clinic: [facility]          Admission Diagnoses:   Labor and delivery indication for care or intervention [O75.9]    Hypoatremia, resolved by day of discharge       Discharge Diagnosis:     Third degree perineal laceration  Vacuum extraction         Procedures:     Procedure(s): Repair of third degree perineal laceration  Shoulder dystocia reduction       No other procedures performed during this admission           Medications Prior to Admission:     Medications Prior to Admission   Medication Sig Dispense Refill Last Dose    omega 3 1000 MG CAPS Take 715 mg by mouth   3/27/2024    Prenatal Vit-Fe Fumarate-FA (PRENATAL MULTIVITAMIN  PLUS IRON) 27-1 MG TABS Take by mouth daily   3/27/2024    VITAMIN D PO    3/27/2024    [DISCONTINUED] diphenhydrAMINE (BENADRYL) 25 MG tablet Take 25 mg by mouth every 6 hours as needed for itching or allergies                Discharge Medications:     Current Discharge Medication List        CONTINUE these medications which have NOT CHANGED    Details   omega 3 1000 MG CAPS Take 715 mg by mouth      Prenatal Vit-Fe Fumarate-FA (PRENATAL MULTIVITAMIN  PLUS IRON) 27-1 MG TABS Take by mouth daily      VITAMIN D PO            STOP taking these medications       diphenhydrAMINE (BENADRYL) 25 MG tablet Comments:   Reason for Stopping:                     Consultations:   Consultation during this admission received from family practice, see notes. Followed lab values z 4 and 8, normal values by discharge          Brief History of Labor:     iGulia Spivey MRN# [id number]   Age: 28 year old YOB: 1995      L&D Delivery Note:      Giulia Spivey is a 28 year old  now  who presented at 41w6d in spontaneous labor.  Pregnancy was unremarkable. She received no augmentation. Labor course was complicated by non-reassuring fetal status.  She progressed to complete and began pushing with excellent maternal effort. Her fetal heart tracing was notable for fetal bradycardia with baseline of 90. Fetal station was noted to be +2. Vacuum-assistance was recommended. Risks of a vacuum including increased risk of tearing, bleeding under the scalp and into the brain for the baby, and failure requiring  section were reviewed and she agreed to proceed. Vacuum was applied and there were two pop-offs after which the head delivered. She developed a shoulder dystocia which was relieved with the posterior arm and lasted 30 seconds. She then had a vaginal delivery of a viable male infant in NETTE position. No nuchal cord was noted.  Apgars of 8 and 9 with weight of 3500 grams (7 lb 11.5 oz).  The cord was double clamped after 3 minutes and cut.  A cord segment and cord blood were obtained. The patient had no IV and IM pitocin was given. The placenta was then delivered using gentle traction and suprapubic pressure. The uterus was noted to be firm after fundal massage. The perineum was assessed for lacerations and a 3a laceration was noted.  The 3a laceration was repaired in standard fashion using 2-0 suture, after which the CNM service took over the repair.  On final examination of the perineum, the repair was noted to be hemostatic.  Total QBL was 842 mL.  The placenta appeared intact with a 3V umbilical cord.       Salima Heredia MD  Ob/Gyn Resident, PGY-3  2024 11:44 AM         GA: 41w6d  GP:   Labor Complications:    EBL:   mL  Delivery QBL: 842 mL  Delivery Type: Vaginal, Vacuum (Extractor)   ROM to Delivery Time: rupture date or rupture time have not been documented  Watts Weight: 3.5 kg (7 lb 11.5 oz)     1 Minute 5 Minute 10 Minute   Apgar Totals: 8   9                     Labor Events   Nba Spivey [[id number]]       Labor Event Times       Latent labor onset date/time: 3/28/2024 0200     Active labor onset date: 3/28/24 Onset time:  8:00 AM   Dilation complete date: 3/28/24 Complete time:  9:11 AM   Start pushing date/time: 3/28/2024 0915             Labor Length       1st Stage (hrs): 1 (min): 11   2nd Stage (hrs): 0 (min): 49   3rd Stage (hrs): 0 (min): 5             Labor Events     labor?: No   steroids: None  Labor Type: Spontaneous      Antibiotics received during labor?: No      Rupture identifier: Sac 1  Rupture date/time:       Rupture type: Spontaneous Rupture of Membranes  Fluid color: Clear  Fluid odor: Normal      Augmentation: None         Delivery/Placenta Date and Time       Delivery Date: 3/28/24 Delivery Time: 10:00 AM   Placenta Date/Time: 3/28/2024 10:05 AM  Oxytocin given at the time of delivery: after delivery of placenta  Delivering clinician: Samia Snyder APRN CNM                    Vaginal Counts         Initial count performed by 2 team members:  Two Team Members   Mehreen Snyder           Needles Suture Needles Sponges (RETIRED) Instruments   Initial counts 2   5     Added to count   2 5     Relief counts           Final counts                        Placed during labor Accounted for at the end of labor   FSE No     IUPC No     Cervidil No                                             Apgars    Living status: Living    1 Minute 5 Minute 10 Minute 15 Minute 20 Minute   Skin color: 1  1          Heart rate: 2  2          Reflex irritability: 2  2          Muscle tone: 2  2          Respiratory effort: 1  2          Total: 8  9          Apgars assigned by: SARATH VAZQUEZ-STUDENT; SARATH CAMPBELL         Cord       Vessels: 3 Vessels     Cord Complications: None                Cord Blood Disposition: Lab     Gases Sent?: Yes     Delayed cord clamping?: Yes     Cord Clamping Delay (seconds):  seconds     Stem  cell collection?: No                Resuscitation    Methods: None  [facility] Care at Delivery: Asked by Dr. Murphy to attend the delivery of this full term, male infant with a gestational age of 41 6/7 weeks secondary to shoulder dystocia, and vacuum assist.      2 minutes and 30 seconds of delayed cord clamping were completed.  The infant was stimulated, cried and had spontaneous respirations during delayed cord clamping.  The infant was placed on a warmer, dried and stimulated.   Gross PE is WNL except for mild nasal flaring, acrocyanosis, bruising and molding secondary to vacuum. Assessed infants clavicles, bilateral upper extremities and head following should dystocia and vacuum assist, all WNL. Infant required no further resuscitation.  Infant was shown to mother and father, handoff to nursery nurse and will be transferred to the [facility] for further care.        Gisell SCHNEIDER CNP 3/28/2024 11:33 AM   SARATH Childress Student     Output in Delivery Room: Stool          Measurements       Weight: 7 lb 11.5 oz     Output in delivery room: Stool         Delivery (Maternal) (Provider to Complete) ([id number])    Episiotomy: None  Perineal lacerations: 3rd Repaired?: Yes      Periurethral laceration: right     Repair suture: 2-0 Vicryl, 3-0 Vicryl  Genital tract inspection done: Pos         Blood Loss  Mother: Giulia Spivey #[id number]      Start of Mother's Information       Delivery Blood Loss  24 0800 - 24 1144        Delivery QBL (mL) Hospital Encounter 842 mL     Total   842 mL                    End of Mother's Information  Mother: Giulia Spivey #[id number]                      Delivery - Provider to Complete ([id number])    Delivering clinician: Samia Snyder APRN CNM  Delivery Type (Choose the 1 that will go to the Birth History): Vaginal, Vacuum (Extractor)     Verbal Informed Consent Obtained For Vacuum: Yes Alternate Labor Strategies Discussed (vacuum): Yes        Emergency  Resources Available (vacuum): Charge Nurse/Team, Resuscitation Team             Position (vacuum): OA Fetal Station (vacuum): +2         Indication for Operative Vaginal Delivery (vacuum): Fetal Status      Operative Vaginal Delivery Brief Note Vacuum: The vacuum was applied to the vertex of the head. Suction was applied and there were two pop-offs after which the head delivered.                                           Placenta    Date/Time: 3/28/2024 10:05 AM  Removal: Spontaneous  Disposition: Hospital disposal                 Anesthesia    Method: None                                       Salima Heredia MD     Staff MD Note  I was present and scrubbed for the entire procedure noted above.  I agree with the description above and any necessary changes have been made by me.  Ronda Murphy MD     Assessment Day of Discharge    Pt stable, baby is rooming in  Breast feeding status:well established  Complications since 2 hours post delivery: None  Patient is tolerating acitivity well Voiding without difficulty, cramping is relieved by Ibuprophen, lochia is decreasing and patient denies clots.  Perineal pain is is relieved by Ibuprophen.    postpartum exam   Breasts:soft, filling  Nipples:erect, no lesions, intact  Abdomen: soft, nontender, fundus firm, umb/-1, midline  Perineum:  laceration is well approximated, healing well, approximated, no edema, erythema, bruising, hematoma or s/s of infection  Lochia: min rubra, no clots, no odor  Legs: nontender, trace edema    Patient Vitals for the past 24 hrs:   BP Temp Temp src Pulse Resp   03/30/24 1209 (!) 136/93 -- -- 108 --   03/30/24 0930 (!) 148/87 98  F (36.7  C) Oral 100 17   03/30/24 0647 125/80 -- -- 86 16   03/30/24 0229 117/71 -- -- 80 16   03/29/24 2231 131/85 97.8  F (36.6  C) Oral 84 16   03/29/24 1837 120/73 98.1  F (36.7  C) Oral 86 16   03/29/24 1437 124/72 98  F (36.7  C) Oral 104 16                Hospital Course:   The patient's hospital course was  unremarkable.  On discharge, her pain was well controlled. Vaginal bleeding is similar to peak menstrual flow.  Voiding without difficulty.  Ambulating well and tolerating a normal diet.  No fever.  Breastfeeding well.  Infant is stable.  No bowel movement yet.*  She was discharged on post-partum day #2.    Post-partum hemoglobin:   Hemoglobin   Date Value Ref Range Status   03/29/2024 8.4 (L) 11.7 - 15.7 g/dL Final      ASSESSMENT/PLAN:  Patient Active Problem List    Anemia due to blood loss, acute         Priority: Medium [2]         Date Noted: 03/29/2024            3/28 Hgb 10.3            3/29 Hgb 8.4       Thalassemia alpha carrier         Priority: Medium [2]         Date Noted: 03/28/2024             is not a carrier      Need for varicella vaccine         Priority: Medium [2]         Date Noted: 03/28/2024            Varicella nonimmune      Vacuum-assisted vaginal delivery         Priority: Medium [2]         Date Noted: 03/28/2024      Gestational hypertension         Priority: Medium [2]         Date Noted: 03/28/2024      Vitamin D deficiency         Priority: Medium [2]         Date Noted: 02/28/2024      Normal pregnancy in third trimester         Priority: Medium [2]         Date Noted: 02/27/2024            [facility] Group            choice: CNM group            Partner's name: Reuben            []NOB folder            [x]Dating by LMP            [x] NIPT neg            [x] AFP neg            [x]Fetal anatomy US             [x]Rubella immune            []Hep B immune (if nonimmune, enter dx)            []Varicella NOT immune            []Pap            []COVID vaccine completed            _____________________________________            [x]EOB folder            []PP Contraception plan: If tubal,consent date:            [x]Labor plans: unmedicated            [x]: Laury Choe            [x]Infant feeding plan: breast            []FLU shot             [x]TDAP             []RSV            []Rhogam if needed, date:            []TOLAC consent done            [x] Water birth interest            [x]GCT, passed            ________________________________________            [x] OTC PP meds sent            []PP plans:            []Planning CS-ERAS pkt                    Stable Post-partum day #2  Complications:anemic, plan for iron supplementation  Plan d/c home today  RTC 2 weeks  Teaching done: D/C Instructions: Nutrition/Activity, Engorgement Management, Warning Signs/When to Call: Excessive Bleeding, Infection, PP Depression, Kegals and Crunches, RTC Clinic for PP Appointment, PNV, and Iron supplemenation    Postpartum warning s/s reviewed, including bleeding/clots, fever, mastitis, or depression    Current Discharge Medication List        CONTINUE these medications which have NOT CHANGED    Details   omega 3 1000 MG CAPS Take 715 mg by mouth      Prenatal Vit-Fe Fumarate-FA (PRENATAL MULTIVITAMIN  PLUS IRON) 27-1 MG TABS Take by mouth daily      VITAMIN D PO            STOP taking these medications       diphenhydrAMINE (BENADRYL) 25 MG tablet Comments:   Reason for Stopping:                    Discharge Instructions and Follow-Up:     Discharge diet: Regular   Discharge activity: No lifting,  or strenuous exercise for 4-6 week(s)  Pelvic rest: abstain from intercourse and do not use tampons for 6 week(s)   Discharge follow-up: In 2 weeks-either RN phone or in person visit if desires, then 6-8 wks CNM   Wound care: Drink plenty of fluids           Discharge Disposition:     Discharged to home      Enrolled in HOPE BP program, discussed BP cuff and checking daily,   Given mildly elevated BPs and discussion about HTN risks, pt now would like to take procardia daily. Given her dose today before going home and RX sent  WYATT Coates CNM

## 2024-03-31 ENCOUNTER — TELEPHONE (OUTPATIENT)
Dept: OBGYN | Facility: CLINIC | Age: 29
End: 2024-03-31
Payer: COMMERCIAL

## 2024-04-01 ENCOUNTER — TELEPHONE (OUTPATIENT)
Dept: OBGYN | Facility: CLINIC | Age: 29
End: 2024-04-01
Payer: COMMERCIAL

## 2024-04-01 ENCOUNTER — TELEPHONE (OUTPATIENT)
Dept: MATERNAL FETAL MEDICINE | Facility: CLINIC | Age: 29
End: 2024-04-01
Payer: COMMERCIAL

## 2024-04-01 NOTE — TELEPHONE ENCOUNTER
----- Message from WYATT Jim CNM sent at 3/30/2024 10:11 AM CDT -----  Hello,  Can you please call patient to schedule a 2 wk 20 min pp visit and a 6 wk 40 min pp visit?    Thanks!  Cinyd

## 2024-04-01 NOTE — TELEPHONE ENCOUNTER
Initially left message for pt. Pt returned call and was able to introduce HOPE-BP program. Pt confirmed she was discharged on 30mg daily of nifedipine. Pt states she had ear pressure and a headache that comes and goes. Based on pts description, HARMAN seems to be related to sinus pressure. Pt states HA is relieved after a steam shower and Flonase is helping. Talked through attempting to take one zyrtec to see if this helps the headache to help confirm if this is a sinus headache or something else. Pt denies other symptoms of Pre-e. Pt voiced understanding and has call back number.      Lorri Mustafa RN on 4/1/2024 at 9:52 AM        Pt called back stating she did not take Zyrtec and her ear pain is still present. She also feels like she is developing a fever and is currently 99.2. Writer recommended going to an Urgent care. SO and infant could wait in the car to decrease germ exposer.  Writer reached out to [facility], they stated they would also recommend an urgent care visit for these symptoms.     Lorri Mustafa RN on 4/1/2024 at 4:12 PM

## 2024-04-01 NOTE — TELEPHONE ENCOUNTER
"Patient calling with a HA. Noticed it yesterday, on and off since. She has been taking Tylenol 500mg q6hr and ibuprofen 600mg q6h.  Shower and sleep relieved it for a while and then it returned.  She has been having ear pain and pressure since delivery.  It is also \"echo-y\" to hear from that ear. BP: this /82 and all yesterday were in the green range.  Denies vision changes, RUQ pain, and new swelling.  RN advised to continue to monitor BP and for pre e S/S but to present to the urgent care to have her ear evaluated. This could be the cause of the HA.  Patient agreeable.    "

## 2024-04-02 ENCOUNTER — TELEPHONE (OUTPATIENT)
Dept: MATERNAL FETAL MEDICINE | Facility: CLINIC | Age: 29
End: 2024-04-02
Payer: COMMERCIAL

## 2024-04-02 NOTE — TELEPHONE ENCOUNTER
"Home Observation of Postpartum Elevated BP (HOPE-BP) Program     Giulia Spivey enrolled in the HOPE-BP Program on 3/30/2024, 3 days ago. Delivered on 3/28/2024 . Diagnosis: Gestational hypertension. Medication(s) prescribed: Nifedipine ER.  Patient reported the following blood pressures in the past 72 hours:       3/30/2024 3/31/2024 4/1/2024 4/2/2024   [facility] Health Trends BP Review Flowsheet   Systolic (Patient Reported) 127 118    149 112    123 121   Diastolic (Patient Reported) 94 76    81 68    82 79     Spoke with patient regarding symptoms of lightheadedness and feeling off.  Pt states she did go to Urgent care yesterday and was diagnosed with ear and sinus infections. Pt states she took her BP prior to her medication this morning and it was within normal limits. She stated she has not felt like this in awhile and is worried it is from the medication. Educated pt on ER medications and they the goal is for complete coverage and it is still working in her system even though she has not taken the next dose yet. Pt stated she is on campus and would like to come up for \"some things\". Pt educated on how this is a remote monitoring program and we do not have in person appointments. Pt instructed to call [facility] to book an appointment if she feels she needs to be seen.     Writer asked how pt is feeling overall, pt states she has been having diarrhea for the last few days. Pt states she is nursing on demand. Pt does express eating and drinking adequately. Writer explain with t diarrhea and breastfeeding pt needs to be taking in a very large amount of fluid to not become dehydrated. Pt encouraged to drink some electrolytes to help replenish what was lost. Encourage pt to continue taking her BP med and HOPE-BP RN will follow up with her tomorrow morning.     Lorri Mustafa RN on 4/2/2024 at 10:58 AM      Lorri Mustafa RN  "

## 2024-04-03 ENCOUNTER — TELEPHONE (OUTPATIENT)
Dept: MATERNAL FETAL MEDICINE | Facility: CLINIC | Age: 29
End: 2024-04-03
Payer: COMMERCIAL

## 2024-04-03 ENCOUNTER — TELEPHONE (OUTPATIENT)
Dept: OBGYN | Facility: CLINIC | Age: 29
End: 2024-04-03
Payer: COMMERCIAL

## 2024-04-03 DIAGNOSIS — E55.9 VITAMIN D DEFICIENCY: Primary | ICD-10-CM

## 2024-04-03 NOTE — TELEPHONE ENCOUNTER
Called pt to check in as requested yesterday. Pt states she is feeling the same but her BP readings have been stable. Pt was curious for the plan for her medications long term if her BP readings continue to be in range. Education pt on the goal of weaning off medication prior to discharge of the program.    Lorri Mustafa RN on 4/3/2024 at 11:09 AM

## 2024-04-03 NOTE — TELEPHONE ENCOUNTER
S-(situation): Giulia calls in with concerns about postpartum diarrhea.    B-(background): Pt had vacuum-assisted vaginal delivery on 3/28/24. 3rd-degree perineal laceration. Triaged by RN team on 4/1 and sent to urgent care to evaluate for ear infection. Pt diagnosed with ear/sinus infection at  and prescribed antibiotics.    A-(assessment): Pt started having diarrhea early on 4/1, before she went to Urgent Care. It has continued since, with pt feeling mildly dehydrated (able to drink adequately with oral fluid). No fevers or signs of worsening infection.    R-(recommendations): Recommended pt hydrate (favoring electrolyte-containing drinks instead of plain water), eat from the BRAT diet, take 1/2-1 tab of immodium to help with the loose stools (recommended pt use sparingly due to her 3rd degree tear), and use a gisele bottle to keep clean. Also went over signs and symptoms of infection and severe dehydration with the patient/when to call us back.

## 2024-04-03 NOTE — TELEPHONE ENCOUNTER
[facility] Call Center    Phone Message    May a detailed message be left on voicemail: yes     Reason for Call: Symptoms or Concerns     If patient has red-flag symptoms, warm transfer to triage line    Current symptom or concern: Diarrhea, gas, fatigue    Symptoms have been present for:  2 day(s)    Has patient previously been seen for this? No    By Cirilo Werner    Date: 4/3/24    Are there any new or worsening symptoms? Yes: Pt spouse is calling because Pt gave birth 6 days ago and the past 2 days patient has been having diarrhea and gas. Patient states that she is also starting to feel fatigue. Pt and spouse is unsure what to do. Please call Pt to discuss    Action Taken: Other: [facility]    Travel Screening: Not Applicable

## 2024-04-04 ENCOUNTER — TELEPHONE (OUTPATIENT)
Dept: OBGYN | Facility: CLINIC | Age: 29
End: 2024-04-04
Payer: COMMERCIAL

## 2024-04-04 ENCOUNTER — MYC MEDICAL ADVICE (OUTPATIENT)
Dept: OBGYN | Facility: CLINIC | Age: 29
End: 2024-04-04
Payer: COMMERCIAL

## 2024-04-04 ENCOUNTER — TELEPHONE (OUTPATIENT)
Dept: MATERNAL FETAL MEDICINE | Facility: CLINIC | Age: 29
End: 2024-04-04
Payer: COMMERCIAL

## 2024-04-04 NOTE — TELEPHONE ENCOUNTER
[facility] Call Center    Phone Message    May a detailed message be left on voicemail: yes     Reason for Call: Other: .     Action Taken: Message routed to:  Other: pt and spouse are calling, they were just speaking to Liz, they are were told to take imodium, but pt is wondering if she should continue taking all of her other medications as well, also pt is 1 week PP and  is wondering when the bright red bleeding might stop, writer was advised to send a TE per secure chat, please reach out to Giulia, thank you    Travel Screening: Not Applicable

## 2024-04-04 NOTE — TELEPHONE ENCOUNTER
[facility] Call Center    Phone Message    May a detailed message be left on voicemail: yes     Reason for Call: Other: Pt recently gave birth a week ago and is wanting to know if its normal that she isnt having anymore vaginal bleeding. Writer was instructed by secure chat to send message to clinic. Please call pt      Action Taken: Message routed to:  Other: [facility]    Travel Screening: Not Applicable

## 2024-04-04 NOTE — TELEPHONE ENCOUNTER
Home Observation of Postpartum Elevated BP (HOPE-BP) Program     Giulia Spivey enrolled in the HOPE-BP Program on 3/30/2024, 5 days ago. Delivered on 3/28/2024 . Diagnosis: Gestational hypertension. Medication(s) prescribed: Nifedipine ER.  Patient reported the following blood pressures in the past 72 hours:       3/30/2024 3/31/2024 4/1/2024 4/2/2024 4/3/2024 4/4/2024   [facility] Health Trends BP Review Flowsheet   Systolic (Patient Reported) 127 118    149 112    123 123    121 117    117 117    117   Diastolic (Patient Reported) 94 76    81 68    82 73    79 77    82 66    79     Spoke with patient regarding recent PO readings. Pt is concerned because she is having some lower readings, she reports having a bp of 99/58 after sitting for about 15min. Pt states she was in a relaxed state and asymptomatic. We discussed discontinuing her Nifedipine. Pt is concerned about possible rebound HTN. RN explained to pt she is currently on the lowest dose and if she rebounded she would be able to restart the medication. Pt would like to take today and her LEIGHA tomorrow to decided when she wants to try weaning before or after the upcoming weekend. Pt states she will call back after she takes her BP in the morning.     Lorri Mustafa RN

## 2024-04-04 NOTE — TELEPHONE ENCOUNTER
S-(situation): patient called through the call center regarding ongoing diarrhea    B-(background): patient delivered vaginally a week ago and was put on antibiotics for a sinus and ear infection, but stated that the diarrhea did start before taking the antibiotics    A-(assessment): patient stated that starting on Monday she has had constant diarrhea at least 5-6 times per day or maybe more. What put on antibiotics for a sinus and ear infection( Ceftin) and did take 2 doses of Imodium yesterday with no relief. Patient stated that she is drinking vegetable broth and liquid IV to try and stay hydrated. I did inquire if she was taking any other medications and patient stated she is taking omega 3, iron supplement, prenatal, vitamin D, and Nifedipine.     R-(recommendations): I did go over with her to stop taking the omega 3, vitamin D, and her iron supplement until her diarrhea has stopped. To keep up with the broth and liquid IV to keep hydrated and I will reach out to the midwife to see if she has any further instructions and then call her back.     I did speak with Josue Hardin CNM to go over the patients symptoms and what she has been doing. Josue suggested that she can take the Imodium 4 times a day and it will take 24 hours for it to start working. If after taking the 4 doses she is not doing any better she should come in for a stool sample to rule out C-Dif. If she is having symptoms of dehydration then she should go into the ER for IV fluids.     I did call and speak with Giulia to go over the recommendations to take her Imodium 4 times today and then if she is not feeling any better to call us in the morning. If she is dizzy, lightheaded, her mouth is dry then she should go into the ER for dehydration. Giulia wanted to know how long she will be bleeding and I did go over that she will be bleeding for at least 6 weeks and if she starts saturating a pad front to back side to side in an hour she needs to go into  the ER.     I did go over with her as well to be applying ice to her bottom to help with any inflammation and pain to also use her gisele bottle every time she goes to the bathroom and to use the Tucks pads that were given to her as well.       All questions were answered.

## 2024-04-05 ENCOUNTER — APPOINTMENT (OUTPATIENT)
Dept: LAB | Facility: CLINIC | Age: 29
End: 2024-04-05
Payer: COMMERCIAL

## 2024-04-05 ENCOUNTER — TELEPHONE (OUTPATIENT)
Dept: MATERNAL FETAL MEDICINE | Facility: CLINIC | Age: 29
End: 2024-04-05
Payer: COMMERCIAL

## 2024-04-05 DIAGNOSIS — R19.7 DIARRHEA, UNSPECIFIED TYPE: Primary | ICD-10-CM

## 2024-04-05 DIAGNOSIS — E55.9 VITAMIN D DEFICIENCY: Primary | ICD-10-CM

## 2024-04-05 LAB
ADV 40+41 DNA STL QL NAA+NON-PROBE: NEGATIVE
ASTRO TYP 1-8 RNA STL QL NAA+NON-PROBE: POSITIVE
C CAYETANENSIS DNA STL QL NAA+NON-PROBE: NEGATIVE
C DIFF TOX B STL QL: NEGATIVE
CAMPYLOBACTER DNA SPEC NAA+PROBE: NEGATIVE
CRYPTOSP DNA STL QL NAA+NON-PROBE: NEGATIVE
E COLI O157 DNA STL QL NAA+NON-PROBE: ABNORMAL
E HISTOLYT DNA STL QL NAA+NON-PROBE: NEGATIVE
EAEC ASTA GENE ISLT QL NAA+PROBE: NEGATIVE
EC STX1+STX2 GENES STL QL NAA+NON-PROBE: NEGATIVE
EPEC EAE GENE STL QL NAA+NON-PROBE: NEGATIVE
ETEC LTA+ST1A+ST1B TOX ST NAA+NON-PROBE: NEGATIVE
G LAMBLIA DNA STL QL NAA+NON-PROBE: NEGATIVE
NOROVIRUS GI+II RNA STL QL NAA+NON-PROBE: NEGATIVE
P SHIGELLOIDES DNA STL QL NAA+NON-PROBE: NEGATIVE
RVA RNA STL QL NAA+NON-PROBE: NEGATIVE
SALMONELLA SP RPOD STL QL NAA+PROBE: NEGATIVE
SAPO I+II+IV+V RNA STL QL NAA+NON-PROBE: NEGATIVE
SHIGELLA SP+EIEC IPAH ST NAA+NON-PROBE: NEGATIVE
V CHOLERAE DNA SPEC QL NAA+PROBE: NEGATIVE
VIBRIO DNA SPEC NAA+PROBE: NEGATIVE
Y ENTEROCOL DNA STL QL NAA+PROBE: NEGATIVE

## 2024-04-05 PROCEDURE — 87507 IADNA-DNA/RNA PROBE TQ 12-25: CPT | Performed by: MIDWIFE

## 2024-04-05 PROCEDURE — 87493 C DIFF AMPLIFIED PROBE: CPT | Mod: XU | Performed by: MIDWIFE

## 2024-04-05 NOTE — TELEPHONE ENCOUNTER
Pt called today requesting to trial off of medication over the weekend. BP stable. Pt also forgot to take 4/5/24 dose. OK to stop med, continue to check BP BID and resume previous dose if BP increases over the weekend.     Lorri Mustafa RN on 4/5/2024 at 4:27 PM

## 2024-04-06 NOTE — PROGRESS NOTES
"Phone call from patient to \"review results\"    Pt is 8 days postpartum and began having diarrhea 4 days ago. She left a stool sample today and tested positive for Astrovirus. Discussed common GI virus that last 5-6 days, passed through fecal-oral route, and is self limiting. Reviewed signs to watch for with baby and when to contact pediatrician. Encouraged excellent hand washing, cleaning, imodium if helpful, tylenol or ibuprofen if uncomfortable. Discussed mild diet increasing as tolerated. Advised plenty of fluids, rehydration solution, soups, etc..patient agreeable.     WYATT Hilario CNM   "

## 2024-04-09 ENCOUNTER — TELEPHONE (OUTPATIENT)
Dept: FAMILY MEDICINE | Facility: CLINIC | Age: 29
End: 2024-04-09
Payer: COMMERCIAL

## 2024-04-09 NOTE — TELEPHONE ENCOUNTER
General Call      Reason for Call:  est care    What are your questions or concerns:  pt is wondering if Yarelis would be willing to see her as a new pt. Pt is aware Yarelis is out of clinic this week. Pt is experiencing an ear inf. Yarelis is PCP to pt's .    Date of last appointment with provider:     Could we send this information to you in [facility] or would you prefer to receive a phone call?:   Patient would prefer a phone call   Okay to leave a detailed message?: Yes at Cell number on file:    Telephone Information:   Mobile [phone]

## 2024-04-11 ENCOUNTER — PRENATAL OFFICE VISIT (OUTPATIENT)
Dept: OBGYN | Facility: CLINIC | Age: 29
End: 2024-04-11
Attending: ADVANCED PRACTICE MIDWIFE
Payer: COMMERCIAL

## 2024-04-11 VITALS
HEART RATE: 106 BPM | DIASTOLIC BLOOD PRESSURE: 73 MMHG | HEIGHT: 64 IN | WEIGHT: 156.6 LBS | SYSTOLIC BLOOD PRESSURE: 112 MMHG | BODY MASS INDEX: 26.73 KG/M2

## 2024-04-11 PROCEDURE — 99207 PR POST PARTUM EXAM: CPT | Performed by: ADVANCED PRACTICE MIDWIFE

## 2024-04-11 PROCEDURE — 99213 OFFICE O/P EST LOW 20 MIN: CPT | Performed by: ADVANCED PRACTICE MIDWIFE

## 2024-04-11 RX ORDER — CEFDINIR 300 MG/1
300 CAPSULE ORAL
COMMUNITY
Start: 2024-04-01 | End: 2024-04-11

## 2024-04-11 NOTE — PROGRESS NOTES
"SUBJECTIVE  28 year old  presents for 2 week post partum visit s/p VAVD delivery on 3/28/24 for breastfeeding and mood check. 3rd degree laceration  Pt is doing well. Breastfeeding with good latch to both breasts. No nipple pain. Lochia decreasing.   Coping well with support. Parents are visiting from [place] and now Reuben's parents are coming after they leave.     Diarrhea has resolved. Able to have bowel movements. No significant pain.   Able to empty bladder without issues.     OBJECTIVE  General- no apparent distress  Breast- soft, NT, nipples intact, no cracking redness or bleeding   Perineum healing well. Stitches present and laceration is well approximated without redness or swelling.  Extremities- no edema  /73   Pulse 106   Ht 1.62 m (5' 3.78\")   Wt 71 kg (156 lb 9.6 oz)   LMP 2023   Breastfeeding Yes   BMI 27.07 kg/m      ASSESSMENT/PLAN  2 weeks postpartum s/p VAVD delivery  - Pelvic floor PT follow up from 3rd degree lac. Recommend starting after 8 weeks postpartum.  - No signs or symptoms of PPD. Still processing birth experience. Working with  to process birth. Recommended connecting with a therapist if she continues to want to process birth.  - Breastfeeding going well. Will follow up with lactation consultant.   - RTO in 4 weeks for 6 week check and prn if questions or concerns    "

## 2024-04-11 NOTE — PATIENT INSTRUCTIONS
Thank you for trusting us with your care!     If you need to contact us for questions about:  Symptoms, Scheduling & Medical Questions; Non-urgent (2-3 day response) Obdulia message, Urgent (needing response today) [phone] (if after 3:30pm next day response)   Prescriptions: Please call your Pharmacy   Billing: [facility] [phone] or [facility]:[phone]    
show

## 2024-04-15 NOTE — TELEPHONE ENCOUNTER
100% will see this patient and baby's father as well.  I was gone last week so note sure if Seems is still having symptoms. Left message for Giulia. She was just in with her infant today. SCOTT Taylor

## 2024-04-16 ENCOUNTER — DOCUMENTATION ONLY (OUTPATIENT)
Dept: OBGYN | Facility: CLINIC | Age: 29
End: 2024-04-16
Payer: COMMERCIAL

## 2024-04-17 ENCOUNTER — OFFICE VISIT (OUTPATIENT)
Dept: OBGYN | Facility: CLINIC | Age: 29
End: 2024-04-17
Payer: COMMERCIAL

## 2024-04-17 VITALS
HEART RATE: 99 BPM | BODY MASS INDEX: 27.6 KG/M2 | OXYGEN SATURATION: 100 % | WEIGHT: 159.7 LBS | DIASTOLIC BLOOD PRESSURE: 85 MMHG | SYSTOLIC BLOOD PRESSURE: 126 MMHG

## 2024-04-17 DIAGNOSIS — O92.79 OTHER DISORDERS OF LACTATION: Primary | ICD-10-CM

## 2024-04-17 PROCEDURE — 99205 OFFICE O/P NEW HI 60 MIN: CPT | Performed by: ADVANCED PRACTICE MIDWIFE

## 2024-04-17 ASSESSMENT — EDINBURGH POSTNATAL DEPRESSION SCALE (EPDS)
I HAVE LOOKED FORWARD WITH ENJOYMENT TO THINGS: AS MUCH AS I EVER DID
THE THOUGHT OF HARMING MYSELF HAS OCCURRED TO ME: NEVER
I HAVE FELT SCARED OR PANICKY FOR NO GOOD REASON: NO, NOT AT ALL
I HAVE BEEN SO UNHAPPY THAT I HAVE BEEN CRYING: NO, NEVER
I HAVE BEEN ANXIOUS OR WORRIED FOR NO GOOD REASON: HARDLY EVER
I HAVE BLAMED MYSELF UNNECESSARILY WHEN THINGS WENT WRONG: NOT VERY OFTEN
THINGS HAVE BEEN GETTING ON TOP OF ME: NO, I HAVE BEEN COPING AS WELL AS EVER
I HAVE BEEN ABLE TO LAUGH AND SEE THE FUNNY SIDE OF THINGS: AS MUCH AS I ALWAYS COULD
I HAVE FELT SAD OR MISERABLE: NO, NOT AT ALL
I HAVE BEEN SO UNHAPPY THAT I HAVE HAD DIFFICULTY SLEEPING: NOT AT ALL
TOTAL SCORE: 2

## 2024-04-17 NOTE — PATIENT INSTRUCTIONS
"   Use good positioning for deep latch, with baby held close to body and baby's head/shoulders/hips in good alignment.  When in a seated position, use a pillow to help bring baby close to breasts, and stepstool to elevate your knees above hips.  You can also experiment with the laid-back or sidelying positions.  When bringing your baby to your breast, compress your breast vertically and in line with baby's mouth--this will help them to get a larger mouthful of breast and a deeper latch.  If there is pinching or pain, or if his mouth is not widely open, try using a finger to give a little gentle pressure on his chin to help his open more widely and take in more of your breast.    Babies latch best to the breast by bringing their chin in first, so point your nipple towards baby's nose, tuck the chin in close, and then wait for his mouth to open.  When his mouth opens, bring his head in deeply (\"up and over the mountain\").  Baby's chin should be snugged deeply in your breast, their upper cheeks should be touching the breast, and their nose just out of the breast.   Continue to nurse baby on cue, 8-12 times each day.  Follow your pediatric provider's guidance about awakening baby--generally once your baby has returned to their birthweight, you can trust them to awaken when they need to eat--you do not need to awaken them on a schedule.  When you nurse, feed on one side until baby finishes swallowing.  Once swallowing slows, use breast compression to encourage more swallowing, but once there is no more active swallowing, and baby is either sleeping, coming off the breast, or just \"nibbling,\" it is OK to use a finger to take baby off the breast and move to the other breast.  Do the same on the other side.  Offer both breasts at each feeding.  It is more important to watch the baby than the clock!    Jewel needs about 21 - 22 oz of milk each day to catch up on his weight and grow well, or about 2 - 2.25 oz each feeding if " he is eating 10 times/day.  If he nurses at home as he did in the office today, about 10 times/day, he needs about 6 oz per day in supplementation, using your breastmilk as your first choice.  You can give this after feedings, or distributed throughout the day according to his feeding cues.   You can obtain this extra milk for Jewel in whatever way is easiest for you, by using a milk- during the second half of feedings or by using your pump.  An electric pump would have the benefit of being a bit more efficient and perhaps stimulating your breasts more effectively; although your milk supply is normal, increasing it just slightly might help Jewel with his efficiency at nursing. Sometimes pumping while you have some warmth on your breasts (like a heating pad or microwaved hot pack) is helpful, and gentle massage can also help release more milk.   It is more effective to pump more frequently for shorter time periods than it is to pump less often for longer:  For example, it is better to pump 6 times/day for 15 minutes each than it is to pump 3 times/day for 30 minutes.   Just take the amount of milk out of your breasts that you need.  Sometimes if babies have slow weight gain in early days, they can become more sleepy and less productive at breast, which leads to lower intake, lower milk supply and further poor gain.  Helping Jewel continue catch up on gain will often lead to more energetic nursing, more intake, and better supply.   Follow up with lactation as needed, and pediatric provider as planned.  [employer] can be used for brief questions, but it's important to know that messages are not seen Friday through Sunday. If urgent help is needed, Monday through Friday you can call [phone] and one of our lactation consultants will get the message and respond; if you need a rapid response over a weekend or holiday, it is best to call your on-call maternity or pediatric provider.  Please feel free to  "schedule a return visit if the concern is more detailed;  telephone visits are also an option if you don't feel you need to be seen in person.     ____________     Using milk-catchers:    If you find that you are having a lot of uncomfortable leaking during feedings, you can just use a hand to put some pressure on your areolar area and this will stop the flow.  If you would like to collect the milk that is released with letdown without encouraging more supply, consider a type of  that does not use suction to stay in place.  The Haakaa Eloise, Philoptima Catch, and [employer] Milk-Saver can all be used in this way.  Alternatively, if you would like to use a  to gather enough milk for a bottle, you could use a type that stays in place with suction and draws out more milk.  The best way to use these is to nurse your baby on one side, and then when you move baby to the second side, place the  on the first side.  In this way baby always gets \"first choice.\"  Just collect the amount of milk that you will use.     ______________________    For good videos on the laid-back breastfeeding position:  Www.[employer]feeding.[employer]  Www.[employer]       ___________    Video of sidelying breastfeeding  https://www.youtube.com/watch?v=XYM0ngcAoyG       ____________________    Good breastfeeding resources:    Websites:  www.[employer]  www.[employer]/blog/  www.llli.org/breastfeeding-info/    Instagram:    [email or web address]  [email or web address]    Books:   The Womanly Art of Breastfeeding by La Leche League  Breastfeeding Doesn't Need to Suck, by Alona Hernández      For help with using baby carriers:  https://babywearingtwincities.org/   "

## 2024-04-17 NOTE — PROGRESS NOTES
"Assessment:   1.  Nearly three week old infant with history of slow weight gain, now gaining about 1 oz/day over last week with addition of expressed-milk supplementation  2.  Slightly weak suck and tendency to slightly shallow latch with cheek dimpling, improved after reposition to laid-back hold  3.  Milk transfer slightly below baby's needs during observed feeding in office today:  in need of continued supplementation with expressed milk  4.  Mother with normal milk supply    Plan:   Use good positioning for deep latch, with baby held close to body and baby's head/shoulders/hips in good alignment.  When in a seated position, use a pillow to help bring baby close to breasts, and stepstool to elevate your knees above hips.  You can also experiment with the laid-back or sidelying positions.  When bringing your baby to your breast, compress your breast vertically and in line with baby's mouth--this will help them to get a larger mouthful of breast and a deeper latch.  If there is pinching or pain, or if his mouth is not widely open, try using a finger to give a little gentle pressure on his chin to help his open more widely and take in more of your breast.    Babies latch best to the breast by bringing their chin in first, so point your nipple towards baby's nose, tuck the chin in close, and then wait for his mouth to open.  When his mouth opens, bring his head in deeply (\"up and over the mountain\").  Baby's chin should be snugged deeply in your breast, their upper cheeks should be touching the breast, and their nose just out of the breast.   Continue to nurse baby on cue, 8-12 times each day.  Follow your pediatric provider's guidance about awakening baby--generally once your baby has returned to their birthweight, you can trust them to awaken when they need to eat--you do not need to awaken them on a schedule.  When you nurse, feed on one side until baby finishes swallowing.  Once swallowing slows, use breast " "compression to encourage more swallowing, but once there is no more active swallowing, and baby is either sleeping, coming off the breast, or just \"nibbling,\" it is OK to use a finger to take baby off the breast and move to the other breast.  Do the same on the other side.  Offer both breasts at each feeding.  It is more important to watch the baby than the clock!    Jewel needs about 21 - 22 oz of milk each day to catch up on his weight and grow well, or about 2 - 2.25 oz each feeding if he is eating 10 times/day.  If he nurses at home as he did in the office today, about 10 times/day, he needs about 6 oz per day in supplementation, using your breastmilk as your first choice.  You can give this after feedings, or distributed throughout the day according to his feeding cues.   You can obtain this extra milk for Jewel in whatever way is easiest for you, by using a milk- during the second half of feedings or by using your pump.  An electric pump would have the benefit of being a bit more efficient and perhaps stimulating your breasts more effectively; although your milk supply is normal, increasing it just slightly might help Jewel with his efficiency at nursing. Sometimes pumping while you have some warmth on your breasts (like a heating pad or microwaved hot pack) is helpful, and gentle massage can also help release more milk.   It is more effective to pump more frequently for shorter time periods than it is to pump less often for longer:  For example, it is better to pump 6 times/day for 15 minutes each than it is to pump 3 times/day for 30 minutes.   Just take the amount of milk out of your breasts that you need.  Sometimes if babies have slow weight gain in early days, they can become more sleepy and less productive at breast, which leads to lower intake, lower milk supply and further poor gain.  Helping Jewel continue catch up on gain will often lead to more energetic nursing, more intake, and " better supply.   Follow up with lactation as needed, and pediatric provider as planned.  [employer] can be used for brief questions, but it's important to know that messages are not seen Friday through Sunday. If urgent help is needed, Monday through Friday you can call [phone] and one of our lactation consultants will get the message and respond; if you need a rapid response over a weekend or holiday, it is best to call your on-call maternity or pediatric provider.  Please feel free to schedule a return visit if the concern is more detailed;  telephone visits are also an option if you don't feel you need to be seen in person.     Subjective: Giulia and Reuben are here today because baby Jewel has had slow weight gain.  They were seen earlier this week by pediatric provider, who recommended supplementation with expressed milk. They report that although his latch seems good and they do hear swallowing with feeding, he is very difficult to keep alert and active during nursing.  Would like to evaluate his milk transfer.    Giulia is fully vaccinated for Covid-19.    Hospital Course: Spontaneous labor;  birth complicated by fetal bradycardia requiring vacuum-assisted birth as well as 30-sec shoulder dystocia.  3rd degree laceration with about 800 ml QBL.  Seen by hospital IBCLC for routine support.  Baby with slow weight gain, remained below birthweight at 2 weeks--pediatric provider recommended  adding supplementation with expressed milk at visit yesterday.      Mother's Relevant Med/Surg History: carrier of alpha thalassemia    Breastfeeding Goals: continued breastfeeding    Previous Breastfeeding Experience: first baby    Infant's name: Jewel Lacey  Infant's bday: [birth year]  Gestational age: 41w6d  Infant's birth weight: 7 # 11.5 oz   Discharge weight: 7 # 2.1 oz    Pediatric Provider: Yarelis Fields, CNP, [facility]  Recent weights:  4/2/24:  7 # 2 oz   4/5/24:  7 # 3.5 oz  4/11/24:  7 # 5.5  "oz  4/15/24:  7 # 9 oz    Frequency and duration of feedings:  every 2 hours during the day and every 3 hours at night  Swallows audible per mother: yes  Numbers of feedings in 24 hours: 9 - 10  Number urines per day: 7  Number of stools per day and their color: 5 - 6    Supplementation: with 1-2 extra ounces after four feedings/day   Pumping: using passive  with each feeding, yielding about 6 oz/day.  Occasionally uses hand pump, a few times/day, yielding just drops    Objective/Physical exam:   Mother: Noticed breasts grew larger and areolas darkened during pregnancy and she noticed primary engorgement when her milk came in on about day 2      Objective/Physical exam:   Her nipples are everted, the areola is compressible, the breast is soft and full.     Sore nipples: some \"dryness\"  but no pain or cracking  EPDS: 2, with \"never\" marked for question on thoughts of self-harm     Assessment of infant: 14.06% Weight for age percentile   Age today: 3 1/2 weeks  Today's weight: 7 # 11.8 oz  Amount of milk transferred from LEFT side: 1.2 oz  Amount of milk transferred from RIGHT side: 0.4 oz    Baby has full flexion of arms and legs, normal tone, behavior is alert and active, respirations are normal, skin is normal, hydration is normal, jaw is normal size and alignment, palate is normal, frenulum is normal, baby can lateralize tongue, has adequate tongue lift, and tongue can protrude past bottom gum line. Upper labial frenulum is normal.    Suck exam:  Baby has slightly weak but coordinated suck with good tongue cupping    Baby thrush: none    Jaundice: none      Feeding assessment: Baby can hold suction with tongue while at the breast.      Alignment: The baby was flex relaxed. Baby's head was aligned with its trunk. Baby did face mother. Baby was in cross cradle and laid-back positions today.   Areolar Grasp: Baby was able to open mouth widely. Baby's lips were not pursed, but did notice some dimpling of " baby's cheeks during sucking, not relieved with gentle chin pressure and repositioning at breast.  On second side used laid-back position, and latch appeared deeper with no cheek dimpling. Baby's lips did flange outward. Tongue was not easily visible over bottom gum. Baby had complete seal.     Areolar Compression: Baby made rhythmic motion. There were no clicking or smacking sounds. There was no severe nipple discomfort. Nipples appeared rounded after feeding.  Audible swallowing: Baby made quiet sounds of swallowing: There was an increase in frequency after milk ejection reflex. The milk ejection reflex is normal and milk supply is normal.     /85   Pulse 99   Wt 72.4 kg (159 lb 11.2 oz)   LMP 2023   SpO2 100%   BMI 27.60 kg/m       LMP 2023   OB History    Para Term  AB Living   1 1 1 0 0 1   SAB IAB Ectopic Multiple Live Births   0 0 0 0 1      # Outcome Date GA Lbr Barrie/2nd Weight Sex Type Anes PTL Lv   1 Term 24 41w6d 01::49 3.5 kg (7 lb 11.5 oz) M Vag-Vacuum None N KRISH      Name: Jewel Lacey      Apgar1: 8  Apgar5: 9      Obstetric Comments   *First Pregnancy          Current Outpatient Medications:     NIFEdipine ER (ADALAT CC) 30 MG 24 hr tablet, Take 1 tablet (30 mg) by mouth daily (Patient not taking: Reported on 2024), Disp: 30 tablet, Rfl: 2    omega 3 1000 MG CAPS, Take 715 mg by mouth (Patient not taking: Reported on 2024), Disp: , Rfl:     Prenatal Vit-Fe Fumarate-FA (PRENATAL MULTIVITAMIN  PLUS IRON) 27-1 MG TABS, Take by mouth daily, Disp: , Rfl:     VITAMIN D PO, , Disp: , Rfl:   No past medical history on file.  Past Surgical History:   Procedure Laterality Date    WISDOM TOOTH EXTRACTION Bilateral      Family History   Problem Relation Age of Onset    Hypothyroidism Mother     No Known Problems Father     No Known Problems Brother     Diabetes Paternal Grandfather         type 2    Hyperlipidemia Paternal Grandfather      Hypertension Paternal Grandfather        Time spent:  Chart review/prechartin min prior to day of service  Face-to-face visit:   54 min   Documentation:  14 min   Total time spent on day of service: 68 min    WYATT Gage, PRANAY, IBCLC

## 2024-04-19 ENCOUNTER — MYC MEDICAL ADVICE (OUTPATIENT)
Dept: FAMILY MEDICINE | Facility: CLINIC | Age: 29
End: 2024-04-19
Payer: COMMERCIAL

## 2024-05-02 ENCOUNTER — NURSE TRIAGE (OUTPATIENT)
Dept: NURSING | Facility: CLINIC | Age: 29
End: 2024-05-02
Payer: COMMERCIAL

## 2024-05-02 NOTE — TELEPHONE ENCOUNTER
"  OB Triage Call      Is patient's OB/Midwife with the [facility] or [facility]? [facility]- Proceed with triage     Reason for call: Vaginal bleeding post-partum.    Assessment: Patient states she is 5 weeks post-partum. Reports she was having normal flow until this evening, when she felt a gush and passed 2 large clots. She no longer feels that gush and the bleeding has resumed to be what it was previously. She reports she had a vaginal delivery and had 3rd degree tear with repair. She also reports a vacuum was used during delivery. She is breastfeeding. She felt lightheaded earlier, but not severe. Denies any fever or abdominal pain. Denies passing any tissue or foul smelling vaginal discharge.    Plan: Advised to call the clinic in the morning to get appt to be seen tomorrow. Reviewed care advice and call back instructions. Patient plans to follow advice.    Patient's primary OB Provider is [facility]..      Per protocol recommendations Patient to schedule follow up appointment within tomorrow.          41w6d    Estimated Date of Delivery: Data Unavailable        OB History    Para Term  AB Living   1 1 1 0 0 1   SAB IAB Ectopic Multiple Live Births   0 0 0 0 1      # Outcome Date GA Lbr Barrie/2nd Weight Sex Type Anes PTL Lv   1 Term 24 41w6d 01:11 / 00:49 3.5 kg (7 lb 11.5 oz) M Vag-Vacuum None N KRISH      Name: Jewel Lacey      Apgar1: 8  Apgar5: 9      Obstetric Comments   *First Pregnancy          No results found for: \"GBS\"       Roxana Pelaez RN     Reason for Disposition   [1] Passing blood clots AND [2] persists > 4 days postpartum    Additional Information   Negative: Shock suspected (e.g., cold/pale/clammy skin, too weak to stand, low BP, rapid pulse)   Negative: Difficult to awaken or acting confused (e.g., disoriented, slurred speech)   Negative: Passed out (i.e., lost consciousness, collapsed and was not responding)   Negative: Sounds like a " life-threatening emergency to the triager   Negative: SEVERE dizziness (e.g., unable to stand, requires support to walk, feels like passing out now)   Negative: [1] SEVERE abdominal pain AND [2] present > 1 hour   Negative: Fever 100.4 F (38.0 C) or higher   Negative: SEVERE vaginal bleeding (e.g., soaking 2 pads or tampons per hour and present 2 or more hours; 1 menstrual cup every 2 hours)   Negative: Patient sounds very sick or weak to the triager   Negative: MODERATE vaginal bleeding (e.g., soaking 1 pad or tampon per hour and present > 6 hours; 1 menstrual cup every 6 hours)   Negative: [1] Constant abdominal pain AND [2] present > 2 hours   Negative: Pale skin (pallor) of new-onset or worsening   Negative: Foul smelling vaginal discharge (i.e., lochia)   Negative: Passed tissue (e.g., gray-white, reddish-purple)   Negative: Bleeding with > 6 soaked pads per day    Protocols used: Postpartum - Vaginal Bleeding and Lochia-A-AH

## 2024-05-03 ENCOUNTER — TELEPHONE (OUTPATIENT)
Dept: OBGYN | Facility: CLINIC | Age: 29
End: 2024-05-03
Payer: COMMERCIAL

## 2024-05-03 NOTE — TELEPHONE ENCOUNTER
[facility] Call Center    Phone Message    May a detailed message be left on voicemail: yes     Reason for Call: Other: . Pts spouse Reuben is calling, they spoke to FNA last night, please see encounter in chart, pt is post partum and is having some bleeding, they were instructed by FNA to come into the clinic today, writer doesn't have any openings, please call Reuben, thank you    Action Taken: Message routed to:  Other: obgyn    Travel Screening: Not Applicable

## 2024-05-03 NOTE — TELEPHONE ENCOUNTER
Giulia was transferred to me by the call center to discuss postpartum bleeding concerns.      She delivered on 3/28/24 via vacuum-assisted , shoulder dystocia lasting 30 seconds, and a 3a laceration.      At the end of last week, she thought her bleeding had stopped.      Last night she began bleeding again and passed two clots last night at 7pm that were marble-sized.  At midnight she passed one more, smaller clot.      At this point she is wearing a pad and bleeding moderately.  She is not saturating pads between changes.     Giulia was instructed by the triage team last night to call and come in to the clinic today.  I explained that we have no available appointments, but that I would run her symptoms past our midwives in clinic.     Discussed with two CNMs in clinic, who said these are not concerning symptoms at this time, and she does not need to be seen today, but could offer appointment Monday if she wants to discuss.      I called Giulia back and discussed this, re-iterated when to call or head to ED for concerning bleeding, and scheduled her for Monday visit with CNM per request.

## 2024-05-09 ENCOUNTER — TELEPHONE (OUTPATIENT)
Dept: OBGYN | Facility: CLINIC | Age: 29
End: 2024-05-09

## 2024-05-09 ENCOUNTER — LAB (OUTPATIENT)
Dept: LAB | Facility: CLINIC | Age: 29
End: 2024-05-09
Attending: ADVANCED PRACTICE MIDWIFE
Payer: COMMERCIAL

## 2024-05-09 ENCOUNTER — MYC MEDICAL ADVICE (OUTPATIENT)
Dept: FAMILY MEDICINE | Facility: CLINIC | Age: 29
End: 2024-05-09

## 2024-05-09 ENCOUNTER — HOSPITAL ENCOUNTER (EMERGENCY)
Facility: CLINIC | Age: 29
Discharge: HOME | End: 2024-05-09
Attending: FAMILY MEDICINE | Admitting: FAMILY MEDICINE
Payer: COMMERCIAL

## 2024-05-09 ENCOUNTER — MYC MEDICAL ADVICE (OUTPATIENT)
Dept: OBGYN | Facility: CLINIC | Age: 29
End: 2024-05-09

## 2024-05-09 ENCOUNTER — PRENATAL OFFICE VISIT (OUTPATIENT)
Dept: OBGYN | Facility: CLINIC | Age: 29
End: 2024-05-09
Attending: ADVANCED PRACTICE MIDWIFE
Payer: COMMERCIAL

## 2024-05-09 VITALS
RESPIRATION RATE: 16 BRPM | OXYGEN SATURATION: 100 % | DIASTOLIC BLOOD PRESSURE: 74 MMHG | SYSTOLIC BLOOD PRESSURE: 142 MMHG | WEIGHT: 161 LBS | TEMPERATURE: 98.1 F | BODY MASS INDEX: 27.83 KG/M2 | HEART RATE: 112 BPM

## 2024-05-09 VITALS
BODY MASS INDEX: 27.52 KG/M2 | SYSTOLIC BLOOD PRESSURE: 127 MMHG | HEART RATE: 93 BPM | DIASTOLIC BLOOD PRESSURE: 79 MMHG | HEIGHT: 64 IN | WEIGHT: 161.2 LBS

## 2024-05-09 DIAGNOSIS — E87.1 HYPONATREMIA: Primary | ICD-10-CM

## 2024-05-09 DIAGNOSIS — D62 ANEMIA DUE TO BLOOD LOSS, ACUTE: ICD-10-CM

## 2024-05-09 DIAGNOSIS — E87.1 HYPONATREMIA: ICD-10-CM

## 2024-05-09 LAB
ALBUMIN SERPL BCG-MCNC: 4.8 G/DL (ref 3.5–5.2)
ALBUMIN UR-MCNC: NEGATIVE MG/DL
ALP SERPL-CCNC: 101 U/L (ref 40–150)
ALT SERPL W P-5'-P-CCNC: 34 U/L (ref 0–50)
ANION GAP SERPL CALCULATED.3IONS-SCNC: 10 MMOL/L (ref 7–15)
ANION GAP SERPL CALCULATED.3IONS-SCNC: 13 MMOL/L (ref 7–15)
APPEARANCE UR: CLEAR
AST SERPL W P-5'-P-CCNC: 27 U/L (ref 0–45)
ATRIAL RATE - MUSE: 110 BPM
BILIRUB SERPL-MCNC: 0.6 MG/DL
BILIRUB UR QL STRIP: NEGATIVE
BUN SERPL-MCNC: 6.3 MG/DL (ref 6–20)
BUN SERPL-MCNC: 8.1 MG/DL (ref 6–20)
CALCIUM SERPL-MCNC: 9.4 MG/DL (ref 8.6–10)
CALCIUM SERPL-MCNC: 9.5 MG/DL (ref 8.6–10)
CHLORIDE SERPL-SCNC: 92 MMOL/L (ref 98–107)
CHLORIDE SERPL-SCNC: 97 MMOL/L (ref 98–107)
COLOR UR AUTO: ABNORMAL
CREAT SERPL-MCNC: 0.58 MG/DL (ref 0.51–0.95)
CREAT SERPL-MCNC: 0.58 MG/DL (ref 0.51–0.95)
DEPRECATED HCO3 PLAS-SCNC: 24 MMOL/L (ref 22–29)
DEPRECATED HCO3 PLAS-SCNC: 26 MMOL/L (ref 22–29)
DIASTOLIC BLOOD PRESSURE - MUSE: NORMAL MMHG
EGFRCR SERPLBLD CKD-EPI 2021: >90 ML/MIN/1.73M2
EGFRCR SERPLBLD CKD-EPI 2021: >90 ML/MIN/1.73M2
ERYTHROCYTE [DISTWIDTH] IN BLOOD BY AUTOMATED COUNT: 15.1 % (ref 10–15)
FASTING STATUS PATIENT QL REPORTED: NO
FASTING STATUS PATIENT QL REPORTED: NO
GLUCOSE SERPL-MCNC: 92 MG/DL (ref 70–99)
GLUCOSE SERPL-MCNC: 92 MG/DL (ref 70–99)
GLUCOSE SERPL-MCNC: 97 MG/DL (ref 70–99)
GLUCOSE UR STRIP-MCNC: NEGATIVE MG/DL
HCT VFR BLD AUTO: 35.9 % (ref 35–47)
HGB BLD-MCNC: 11.6 G/DL (ref 11.7–15.7)
HGB UR QL STRIP: ABNORMAL
INTERPRETATION ECG - MUSE: NORMAL
KETONES UR STRIP-MCNC: NEGATIVE MG/DL
LEUKOCYTE ESTERASE UR QL STRIP: NEGATIVE
MCH RBC QN AUTO: 26.1 PG (ref 26.5–33)
MCHC RBC AUTO-ENTMCNC: 32.3 G/DL (ref 31.5–36.5)
MCV RBC AUTO: 81 FL (ref 78–100)
NITRATE UR QL: NEGATIVE
OSMOLALITY UR: 94 MMOL/KG (ref 100–1200)
P AXIS - MUSE: 66 DEGREES
PH UR STRIP: 6.5 [PH] (ref 5–7)
PLATELET # BLD AUTO: 399 10E3/UL (ref 150–450)
POTASSIUM SERPL-SCNC: 4.1 MMOL/L (ref 3.4–5.3)
POTASSIUM SERPL-SCNC: 4.1 MMOL/L (ref 3.4–5.3)
PR INTERVAL - MUSE: 150 MS
PROT SERPL-MCNC: 7.4 G/DL (ref 6.4–8.3)
QRS DURATION - MUSE: 76 MS
QT - MUSE: 312 MS
QTC - MUSE: 422 MS
R AXIS - MUSE: 41 DEGREES
RBC # BLD AUTO: 4.44 10E6/UL (ref 3.8–5.2)
RBC URINE: <1 /HPF
SODIUM SERPL-SCNC: 128 MMOL/L (ref 135–145)
SODIUM SERPL-SCNC: 134 MMOL/L (ref 135–145)
SODIUM UR-SCNC: 31 MMOL/L
SP GR UR STRIP: 1 (ref 1–1.03)
SYSTOLIC BLOOD PRESSURE - MUSE: NORMAL MMHG
T AXIS - MUSE: 41 DEGREES
TSH SERPL DL<=0.005 MIU/L-ACNC: 1.32 UIU/ML (ref 0.3–4.2)
UROBILINOGEN UR STRIP-MCNC: NORMAL MG/DL
VENTRICULAR RATE- MUSE: 110 BPM
WBC # BLD AUTO: 8.3 10E3/UL (ref 4–11)
WBC URINE: 0 /HPF

## 2024-05-09 PROCEDURE — 99283 EMERGENCY DEPT VISIT LOW MDM: CPT | Mod: 25 | Performed by: FAMILY MEDICINE

## 2024-05-09 PROCEDURE — 81001 URINALYSIS AUTO W/SCOPE: CPT | Performed by: FAMILY MEDICINE

## 2024-05-09 PROCEDURE — 93005 ELECTROCARDIOGRAM TRACING: CPT | Mod: RTG

## 2024-05-09 PROCEDURE — 84443 ASSAY THYROID STIM HORMONE: CPT

## 2024-05-09 PROCEDURE — 80053 COMPREHEN METABOLIC PANEL: CPT

## 2024-05-09 PROCEDURE — 99284 EMERGENCY DEPT VISIT MOD MDM: CPT | Performed by: FAMILY MEDICINE

## 2024-05-09 PROCEDURE — 96360 HYDRATION IV INFUSION INIT: CPT | Performed by: FAMILY MEDICINE

## 2024-05-09 PROCEDURE — 85027 COMPLETE CBC AUTOMATED: CPT

## 2024-05-09 PROCEDURE — 96361 HYDRATE IV INFUSION ADD-ON: CPT | Performed by: FAMILY MEDICINE

## 2024-05-09 PROCEDURE — 82310 ASSAY OF CALCIUM: CPT | Performed by: FAMILY MEDICINE

## 2024-05-09 PROCEDURE — 36415 COLL VENOUS BLD VENIPUNCTURE: CPT

## 2024-05-09 PROCEDURE — 82947 ASSAY GLUCOSE BLOOD QUANT: CPT | Performed by: FAMILY MEDICINE

## 2024-05-09 PROCEDURE — 99213 OFFICE O/P EST LOW 20 MIN: CPT | Mod: 25,27 | Performed by: ADVANCED PRACTICE MIDWIFE

## 2024-05-09 PROCEDURE — 258N000003 HC RX IP 258 OP 636: Performed by: FAMILY MEDICINE

## 2024-05-09 PROCEDURE — 83935 ASSAY OF URINE OSMOLALITY: CPT | Performed by: FAMILY MEDICINE

## 2024-05-09 PROCEDURE — 84300 ASSAY OF URINE SODIUM: CPT | Performed by: FAMILY MEDICINE

## 2024-05-09 PROCEDURE — 99207 PR POST PARTUM EXAM: CPT | Performed by: ADVANCED PRACTICE MIDWIFE

## 2024-05-09 PROCEDURE — 36415 COLL VENOUS BLD VENIPUNCTURE: CPT | Performed by: FAMILY MEDICINE

## 2024-05-09 RX ORDER — SODIUM CHLORIDE 9 MG/ML
INJECTION, SOLUTION INTRAVENOUS CONTINUOUS
Status: DISCONTINUED | OUTPATIENT
Start: 2024-05-09 | End: 2024-05-09 | Stop reason: HOSPADM

## 2024-05-09 RX ADMIN — SODIUM CHLORIDE: 9 INJECTION, SOLUTION INTRAVENOUS at 16:02

## 2024-05-09 ASSESSMENT — COLUMBIA-SUICIDE SEVERITY RATING SCALE - C-SSRS
6. HAVE YOU EVER DONE ANYTHING, STARTED TO DO ANYTHING, OR PREPARED TO DO ANYTHING TO END YOUR LIFE?: NO
2. HAVE YOU ACTUALLY HAD ANY THOUGHTS OF KILLING YOURSELF IN THE PAST MONTH?: NO
1. IN THE PAST MONTH, HAVE YOU WISHED YOU WERE DEAD OR WISHED YOU COULD GO TO SLEEP AND NOT WAKE UP?: NO

## 2024-05-09 ASSESSMENT — ACTIVITIES OF DAILY LIVING (ADL)
ADLS_ACUITY_SCORE: 35
ADLS_ACUITY_SCORE: 33
ADLS_ACUITY_SCORE: 35

## 2024-05-09 NOTE — TELEPHONE ENCOUNTER
[facility] Call Center    Phone Message    May a detailed message be left on voicemail: yes     Reason for Call: Other: Patient is calling to discuss her lab results. Very concerned about her sodium and worried she will go into a coma. Please reach out asap. Thank you     Action Taken: Other: [facility]    Travel Screening: Not Applicable

## 2024-05-09 NOTE — NURSING NOTE
Chief Complaint   Patient presents with    Prenatal Care     6 week PP     SUBJECTIVE:   Giulia Spivey is here for her 6-week postpartum checkup.     PHQ-9 score: edinburgh:  Hx of Abuse:  No    Delivery Date: 3/28/24.    Delivering provider:  Ronda Murphy MD.    Type of delivery:  .  Perineum:  tear, with repair.     Delivery complications: None  Infant gender:  boy, weight 7 pounds 11.5 oz.  Feeding Method:  .  Complications reported with feeding:  none, infant thriving .    Bleeding:  Heavy, lightened up, spotting now Duration:  2 weeks, moderate for 3 weeks, light for last week, spotting now.  Menses resumed:  No  Bowel/Urinary problems:  Yes     Contraception Planned:  discuss options   She  has not had intercourse since delivery..

## 2024-05-09 NOTE — ED PROVIDER NOTES
[facility] [place] EMERGENCY DEPARTMENT ([facility])  5/09/24  ED 09      History     Chief Complaint   Patient presents with    Abnormal Labs     HPI  Giulia Spivey is a 28 year old female who presents to the ED for evaluation of hyponatremia. Patient is 6 weeks post-partum, was seen at her OB/Gyn clinic this morning and had labs drawn which revealed a sodium level of 128. She was concerned of this as patient previously required IV sodium after she had severe hyponatremia during labor and delivery. She states over the past couple weeks she has felt very tired, shaky and nauseated at times. She denies vomiting. She denies any confusion. She states over the past 2 days she has felt a little better. She reports she has been having a little bit of difficult emptying her bladder recently. She was told this is normal post-partum by her OB/Gyn doctor. She denies abnormal bowel movements. She has been taking her prenatals, vitamin D, iron, omega-3 and milk supply medications daily. She is breast feeding. She was on HTN meds for a short while post-partum however, not any more. She states she has been drinking up to 2 gallons of water daily. She states she sometimes uses a packet of electrolyte supplements with her water.  reports patient's post-partum course was also complicated by astrovirus, sinus infection and an ear infection. She was on a 10 day course of antibiotics and completed this almost a month ago.       Past Medical History  History reviewed. No pertinent past medical history.  Past Surgical History:   Procedure Laterality Date    WISDOM TOOTH EXTRACTION Bilateral 2013     NIFEdipine ER (ADALAT CC) 30 MG 24 hr tablet  omega 3 1000 MG CAPS  Prenatal Vit-Fe Fumarate-FA (PRENATAL MULTIVITAMIN  PLUS IRON) 27-1 MG TABS  UNABLE TO FIND  VITAMIN D PO      No Known Allergies  Family History  Family History   Problem Relation Age of Onset    Hypothyroidism Mother     No Known Problems Father     No Known  Problems Brother     Diabetes Paternal Grandfather         type 2    Hyperlipidemia Paternal Grandfather     Hypertension Paternal Grandfather      Social History   Social History     Tobacco Use    Smoking status: Never    Smokeless tobacco: Never   Vaping Use    Vaping status: Never Used   Substance Use Topics    Alcohol use: Not Currently    Drug use: Never      Past medical history, past surgical history, medications, allergies, family history, and social history were reviewed with the patient. No additional pertinent items.      A medically appropriate review of systems was performed with pertinent positives and negatives noted in the HPI, and all other systems negative.    Physical Exam   BP: 138/87  Pulse: 120  Temp: 98  F (36.7  C)  Resp: 16  Weight: 73 kg (161 lb)  SpO2: 100 %  Physical Exam  Vitals and nursing note reviewed.   Constitutional:       General: She is not in acute distress.     Appearance: Normal appearance. She is not diaphoretic.   HENT:      Head: Atraumatic.      Mouth/Throat:      Mouth: Mucous membranes are moist.   Eyes:      General: No scleral icterus.     Conjunctiva/sclera: Conjunctivae normal.   Cardiovascular:      Rate and Rhythm: Normal rate.      Heart sounds: Normal heart sounds.   Pulmonary:      Effort: No respiratory distress.      Breath sounds: Normal breath sounds.   Abdominal:      General: Abdomen is flat.   Musculoskeletal:      Cervical back: Neck supple.   Skin:     General: Skin is warm.      Findings: No rash.   Neurological:      Mental Status: She is alert.           ED Course, Procedures, & Data      Procedures            EKG Interpretation:      Interpreted by Flash Marcos MD  Time reviewed: 1435  Symptoms at time of EKG: Electrolyte abnormality suspected  Rhythm: sinus tachycardia  Rate: 100-110  Axis: Normal  Ectopy: none  Conduction: normal  ST Segments/ T Waves: No ST-T wave changes and No acute ischemic changes  Q Waves: none  Comparison to prior: No  old EKG available    Clinical Impression: Sinus tachycardia otherwise normal EKG                Results for orders placed or performed during the hospital encounter of 05/09/24   Basic metabolic panel     Status: Abnormal   Result Value Ref Range    Sodium 134 (L) 135 - 145 mmol/L    Potassium 4.1 3.4 - 5.3 mmol/L    Chloride 97 (L) 98 - 107 mmol/L    Carbon Dioxide (CO2) 24 22 - 29 mmol/L    Anion Gap 13 7 - 15 mmol/L    Urea Nitrogen 6.3 6.0 - 20.0 mg/dL    Creatinine 0.58 0.51 - 0.95 mg/dL    GFR Estimate >90 >60 mL/min/1.73m2    Calcium 9.5 8.6 - 10.0 mg/dL    Glucose 97 70 - 99 mg/dL   Osmolality urine     Status: Abnormal   Result Value Ref Range    Osmolality Urine 94 (L) 100 - 1,200 mmol/kg    Narrative    Reference Ranges depend on patient's hydration status and renal function.   Neonates:  mmol/kg   2 years and older, random specimens: 100-1200 mmol/kg; Greater than 850 mmol/kg after 12 hour fluid restriction  Urine/serum osmolality ratio: 2 years and older: 1.0-3.0; 3.0-4.7 after 12 hour fluid restriction   Sodium random urine     Status: None   Result Value Ref Range    Sodium Urine mmol/L 31 mmol/L   UA with Microscopic reflex to Culture     Status: Abnormal    Specimen: Urine, NOS   Result Value Ref Range    Color Urine Straw Colorless, Straw, Light Yellow, Yellow    Appearance Urine Clear Clear    Glucose Urine Negative Negative mg/dL    Bilirubin Urine Negative Negative    Ketones Urine Negative Negative mg/dL    Specific Gravity Urine 1.002 (L) 1.003 - 1.035    Blood Urine Trace (A) Negative    pH Urine 6.5 5.0 - 7.0    Protein Albumin Urine Negative Negative mg/dL    Urobilinogen Urine Normal Normal, 2.0 mg/dL    Nitrite Urine Negative Negative    Leukocyte Esterase Urine Negative Negative    RBC Urine <1 <=2 /HPF    WBC Urine 0 <=5 /HPF    Narrative    Urine Culture not indicated   EKG 12-lead, tracing only     Status: None (Preliminary result)   Result Value Ref Range    Systolic Blood  Pressure  mmHg    Diastolic Blood Pressure  mmHg    Ventricular Rate 110 BPM    Atrial Rate 110 BPM    NM Interval 150 ms    QRS Duration 76 ms     ms    QTc 422 ms    P Axis 66 degrees    R AXIS 41 degrees    T Axis 41 degrees    Interpretation ECG Sinus tachycardia  Otherwise normal ECG      Results for orders placed or performed in visit on 05/09/24   Comprehensive metabolic panel     Status: Abnormal   Result Value Ref Range    Sodium 128 (L) 135 - 145 mmol/L    Potassium 4.1 3.4 - 5.3 mmol/L    Carbon Dioxide (CO2) 26 22 - 29 mmol/L    Anion Gap 10 7 - 15 mmol/L    Urea Nitrogen 8.1 6.0 - 20.0 mg/dL    Creatinine 0.58 0.51 - 0.95 mg/dL    GFR Estimate >90 >60 mL/min/1.73m2    Calcium 9.4 8.6 - 10.0 mg/dL    Chloride 92 (L) 98 - 107 mmol/L    Glucose 92 70 - 99 mg/dL    Alkaline Phosphatase 101 40 - 150 U/L    AST 27 0 - 45 U/L    ALT 34 0 - 50 U/L    Protein Total 7.4 6.4 - 8.3 g/dL    Albumin 4.8 3.5 - 5.2 g/dL    Bilirubin Total 0.6 <=1.2 mg/dL    Patient Fasting > 8hrs? No    TSH with free T4 reflex     Status: Normal   Result Value Ref Range    TSH 1.32 0.30 - 4.20 uIU/mL   CBC with platelets     Status: Abnormal   Result Value Ref Range    WBC Count 8.3 4.0 - 11.0 10e3/uL    RBC Count 4.44 3.80 - 5.20 10e6/uL    Hemoglobin 11.6 (L) 11.7 - 15.7 g/dL    Hematocrit 35.9 35.0 - 47.0 %    MCV 81 78 - 100 fL    MCH 26.1 (L) 26.5 - 33.0 pg    MCHC 32.3 31.5 - 36.5 g/dL    RDW 15.1 (H) 10.0 - 15.0 %    Platelet Count 399 150 - 450 10e3/uL   Glucose     Status: None   Result Value Ref Range    Glucose 92 70 - 99 mg/dL    Patient Fasting > 8hrs? No      Medications   sodium chloride 0.9 % infusion ( Intravenous $New Bag 5/9/24 5444)     Labs Ordered and Resulted from Time of ED Arrival to Time of ED Departure   BASIC METABOLIC PANEL - Abnormal       Result Value    Sodium 134 (*)     Potassium 4.1      Chloride 97 (*)     Carbon Dioxide (CO2) 24      Anion Gap 13      Urea Nitrogen 6.3      Creatinine 0.58       GFR Estimate >90      Calcium 9.5      Glucose 97     OSMOLALITY, RANDOM URINE - Abnormal    Osmolality Urine 94 (*)    ROUTINE UA WITH MICROSCOPIC REFLEX TO CULTURE - Abnormal    Color Urine Straw      Appearance Urine Clear      Glucose Urine Negative      Bilirubin Urine Negative      Ketones Urine Negative      Specific Gravity Urine 1.002 (*)     Blood Urine Trace (*)     pH Urine 6.5      Protein Albumin Urine Negative      Urobilinogen Urine Normal      Nitrite Urine Negative      Leukocyte Esterase Urine Negative      RBC Urine <1      WBC Urine 0     SODIUM RANDOM URINE    Sodium Urine mmol/L 31       No orders to display          Critical care was not performed.     Medical Decision Making  The patient's presentation was of moderate complexity (an acute illness with systemic symptoms).    The patient's evaluation involved:  review of external note(s) from 2 sources (review of clinic notes from earlier today and review of recent hospital discharge summary)  review of 3+ test result(s) ordered prior to this encounter (review of multiple sodium checks and lab evaluation during recent hospitalization complicated by hyponatremia)  ordering and/or review of 3+ test(s) in this encounter (sodium, creatinine, urine sodium, urine osmolality)    The patient's management necessitated only low risk treatment.    Assessment & Plan    A previously healthy 28-year-old female who is 6 weeks postpartum presenting as a referral from her clinic where she was noted to have a low sodium (128).  Patient's recent hospitalization for vaginal delivery was complicated by hyponatremia of undefined etiology.  Also some elevated postpartum blood pressures which was treated for approximately 5 to 7 days.  No diagnosis of postpartum preeclampsia.  This resolved without further event.  She went for postpartum follow-up visit today and noted that she had been fatigued and was experiencing some lightheadedness.  Also reported drinking 1  to 2 gallons of water daily.  Was noted to have again a lower sodium value.  On exam she is somewhat hypertensive and tachycardic, recheck vital signs markedly improved.  Still slightly tachycardic.  EKG sinus tachycardia without ischemic changes.  Review of vital signs from recent hospitalization had some intermittent low-grade tachycardia as well during the admission.  Does not have chest pain or shortness of breath or oxygen saturation is 100% on room air, respiratory rate is 14-16, and no realistic clinical concern for sepsis, ACS or PE.  Urinalysis normal no elevated protein, LFTs normal earlier today.  Did have some prior postpartum elevated blood pressures, I think late postpartum preeclampsia is highly unlikely.  Mental status is normal, neck supple, mucous membranes moist.  Cardiovascular exam normal.  Physical exam very unremarkable as noted above.  Her urine osmolality is 94, urine sodium 34.  Currently her sodium is 134.  She did drink some electrolyte solution prior to coming in which is not out of the norm for her.  Her other labs are normal.  She had thyroid studies in the clinic earlier today which were also normal.  She received a 1 L bolus of normal saline.  Patient with intermittent hyponatremia which is likely due to water intoxication based on the clinical history and hyponatremia algorithm with labs.  All of the above discussed with the patient, who will watch free water ingestion, use electrolyte solution, monitor for symptoms of hyponatremia, and follow-up with her primary care provider.  We discussed the indications for emergency department return and follow-up.  Stable for discharge.      I have reviewed the nursing notes. I have reviewed the findings, diagnosis, plan and need for follow up with the patient.    New Prescriptions    No medications on file       Final diagnoses:   Hyponatremia   I, JODI MARION, am serving as a trained medical scribe to document services personally performed  by Flash Marcos MD, based on the provider's statements to me.      I, Flash Marcos MD, was physically present and have reviewed and verified the accuracy of this note documented by JODI MARION.     Flash Marcos MD  [facility] EMERGENCY DEPARTMENT  5/9/2024        Flash Marcos MD  05/09/24 4637

## 2024-05-09 NOTE — DISCHARGE INSTRUCTIONS
Thank you for choosing [facility].     Please closely monitor for further symptoms. Return to the Emergency Department if you develop any new or worsening signs or symptoms.    If you received any opiate pain medications or sedatives during your visit, please do not drive for at least 8 hours.     Labs, cultures or final xray interpretations may still need to be reviewed.  We will call you if your plan of care needs to be changed.    Electrolyte solution for hydration as we discussed.  Follow-up with your primary care provider, continue to monitor your blood pressure as discussed.  Please follow up with your primary care physician or clinic.

## 2024-05-09 NOTE — ED TRIAGE NOTES
Pt is 6 weeks post partum and sent from office for hyponatremia of 128 having intense dizziness in the past week and super tired/fatigue. Pt drinking 1.5-2 gallons of water daily.    Pt was nifedipine for 5 days after. Pt is asymptomatic of her sx but does feel tired/exhausted.

## 2024-05-09 NOTE — PROGRESS NOTES
Nursing Notes:   KEN WILSON  5/9/2024 10:18 AM  Sign at exiting of workspace  Chief Complaint   Patient presents with    Prenatal Care     6 week PP     SUBJECTIVE:   Giulia Spivey is here for her 6-week postpartum checkup.     PHQ-9 score: edinburgh: 3  Hx of Abuse:  No    Delivery Date: 3/28/24.    Delivering provider:  Ronda Murphy MD.    Type of delivery:  VAVD.  Perineum:  partial 3rd degree laceration, with repair.     Delivery complications: Gestational HTN, patient declined medication.  Hyponatremia.  Postpartum Hemorrhage. Shoulder dystocia.  Infant gender:  boy, weight 7 pounds 11.5 oz.  Feeding Method:  .  Complications reported with feeding:  none, infant thriving .    Bleeding:  Heavy, lightened up, spotting now Duration:  2 weeks, moderate for 3 weeks, light for last week, spotting now.  Menses resumed:  No  Bowel/Urinary problems:  Yes     Contraception Planned:  discuss options   She  has not had intercourse since delivery..         ================================================================  ROS: 10 point ROS neg other than the symptoms noted above in the HPI.    Patient continues to have symphysis pubis pain that began during pregnancy.  Has pelvic floor physical therapy arranged.  Concerned about bladder function.  Is emptying bladder, but stream is not strong.  States that she is able to empty bladder fully.  She denies dysuria, urgency or frequency.  Denies incontinence.  Wondering if pelvic floor therapy will be helpful for this too.  She was diagnosed with sinus infection and ear infection with GI illness shortly after delivery.  Has plans to follow up with primary care provider.    Patient states that she has had several days of extreme fatigue, feeling exhausted, dizzy and shaky with decreased..  Wondering if it could be related to her low sodium levels that were detected in the hospital.  Consents to repeat CMP today.  She is staying hydrated reports drinking 2-3 gallons  "of water daily with once daily electrolyte supplement of LMNT.    Discussed that pap smear is due, patient would like to defer speculum exam until a later date, does not feel she could tolerate the exam today.  Unsure about what to use for contraception.  Considering [facility], information given at patient request.    EXAM:  /79   Pulse 93   Ht 1.62 m (5' 3.78\")   Wt 73.1 kg (161 lb 3.2 oz)   LMP 06/09/2023   Breastfeeding Yes   BMI 27.86 kg/m      General: healthy, alert, and no distress  Psych: NEGATIVE  Breasts:  Lactating, Nipples intact with no lesions, Non-tender, and No S/S of yeast or mastitis  Abdomen: Benign, Soft, flat, non-tender, No masses, organomegaly, and Diastasis less than 1-2 FB  Vulva:  Normal genitalia and Bartholin's, Urethra, [place]'s normal  Vagina:  epis/repair well healed and patient did not tolerate internal exam      ASSESSMENT:   Encounter Diagnoses   Name Primary?    Anemia due to blood loss, acute     Postpartum examination following vaginal delivery Yes      Normal postpartum exam after vacuum assisted vaginal delivery  Pregnancy was complicated by:  third degree perineal laceration, postpartum hemorrhage.  Gestational hypertension and hyponatremia.      PLAN:  Orders Placed This Encounter   Procedures    Comprehensive metabolic panel    TSH with free T4 reflex    CBC with platelets    Glucose           Contraception methods discussed: handouts given  Exercise encouraged to advance as able  Follow up when she feels able to complete annual exam and pap smear.  Will notify patient of lab results when able.  WYATT Olsen CNM    "

## 2024-05-09 NOTE — ED TRIAGE NOTES
Triage Assessment (Adult)       Row Name 05/09/24 1428          Triage Assessment    Airway WDL WDL        Respiratory WDL    Respiratory WDL WDL        Skin Circulation/Temperature WDL    Skin Circulation/Temperature WDL WDL        Cardiac WDL    Cardiac WDL X     Cardiac Rhythm ST        Peripheral/Neurovascular WDL    Peripheral Neurovascular WDL WDL        Cognitive/Neuro/Behavioral WDL    Cognitive/Neuro/Behavioral WDL WDL

## 2024-05-13 ENCOUNTER — OFFICE VISIT (OUTPATIENT)
Dept: FAMILY MEDICINE | Facility: CLINIC | Age: 29
End: 2024-05-13
Payer: COMMERCIAL

## 2024-05-13 VITALS
SYSTOLIC BLOOD PRESSURE: 118 MMHG | HEIGHT: 63 IN | DIASTOLIC BLOOD PRESSURE: 70 MMHG | HEART RATE: 110 BPM | RESPIRATION RATE: 12 BRPM | WEIGHT: 157.5 LBS | BODY MASS INDEX: 27.91 KG/M2 | TEMPERATURE: 98.2 F | OXYGEN SATURATION: 100 %

## 2024-05-13 DIAGNOSIS — D56.3 THALASSEMIA ALPHA CARRIER: ICD-10-CM

## 2024-05-13 DIAGNOSIS — O13.9 GESTATIONAL HYPERTENSION, ANTEPARTUM: ICD-10-CM

## 2024-05-13 DIAGNOSIS — E87.1 HYPONATREMIA: Primary | ICD-10-CM

## 2024-05-13 LAB
ANION GAP SERPL CALCULATED.3IONS-SCNC: 11 MMOL/L (ref 7–15)
BUN SERPL-MCNC: 7.5 MG/DL (ref 6–20)
CALCIUM SERPL-MCNC: 9.6 MG/DL (ref 8.6–10)
CHLORIDE SERPL-SCNC: 101 MMOL/L (ref 98–107)
CREAT SERPL-MCNC: 0.56 MG/DL (ref 0.51–0.95)
DEPRECATED HCO3 PLAS-SCNC: 24 MMOL/L (ref 22–29)
EGFRCR SERPLBLD CKD-EPI 2021: >90 ML/MIN/1.73M2
GLUCOSE SERPL-MCNC: 93 MG/DL (ref 70–99)
OSMOLALITY UR: 80 MMOL/KG (ref 100–1200)
POTASSIUM SERPL-SCNC: 4.3 MMOL/L (ref 3.4–5.3)
SODIUM SERPL-SCNC: 136 MMOL/L (ref 135–145)
SODIUM UR-SCNC: <20 MMOL/L

## 2024-05-13 PROCEDURE — 36415 COLL VENOUS BLD VENIPUNCTURE: CPT | Performed by: NURSE PRACTITIONER

## 2024-05-13 PROCEDURE — 83935 ASSAY OF URINE OSMOLALITY: CPT | Performed by: NURSE PRACTITIONER

## 2024-05-13 PROCEDURE — 80048 BASIC METABOLIC PNL TOTAL CA: CPT | Performed by: NURSE PRACTITIONER

## 2024-05-13 PROCEDURE — 99207 E-CONSULT TO NEPHROLOGY (ADULT OUTPT PROVIDER TO SPECIALIST WRITTEN QUESTION & RESPONSE): CPT | Performed by: NURSE PRACTITIONER

## 2024-05-13 PROCEDURE — 99417 PROLNG OP E/M EACH 15 MIN: CPT | Performed by: NURSE PRACTITIONER

## 2024-05-13 PROCEDURE — 99205 OFFICE O/P NEW HI 60 MIN: CPT | Performed by: NURSE PRACTITIONER

## 2024-05-13 PROCEDURE — 84300 ASSAY OF URINE SODIUM: CPT | Performed by: NURSE PRACTITIONER

## 2024-05-13 ASSESSMENT — PAIN SCALES - GENERAL: PAINLEVEL: MILD PAIN (2)

## 2024-05-13 NOTE — PATIENT INSTRUCTIONS
Limit water intake to one gallon per day  Use the electrolyte liberally  At this time, do hold the Mothers Milk Plus  Schedule 8 am cortisol lab

## 2024-05-13 NOTE — PROGRESS NOTES
"  Assessment & Plan     Hyponatremia  Drinking quite a bit of water at 1.5-2 gallons per day. Endorses polydipsia. This is reported as pre-delivery baseline.  Advised to cut back to 1 gallon a day right now. Add electrolyte packet to water. Reassured that milk supply should not be affected. Do stop fenugreek at this time.   Sodium is not low at the check here in the clinic right now. If it is low in the future, my hope would be for ADS referral for saline rather than ED.  E-consult to nephrology initiated  Cortisol test ordered for tomorrow 8 am  - Basic metabolic panel  (Ca, Cl, CO2, Creat, Gluc, K, Na, BUN)  - Osmolality urine  - Sodium random urine  - Cortisol; Future    Postpartum hemorrhage, unspecified type  Followed by prenatal provider and has US pending Friday    Gestational hypertension, antepartum  Normal BP off nifedipine    Thalassemia alpha carrier  Noted - does sounds like she respond to iron supplementation    Review of prior external note(s) from - ED and women's health  Ordering of each unique test  I spent a total of 100 minutes on the day of the visit.   Time spent by me doing chart review, history and exam, documentation and further activities per the note    MED REC REQUIRED  Post Medication Reconciliation Status:  Discharge medications reconciled, continue medications without change  BMI  Estimated body mass index is 27.9 kg/m  as calculated from the following:    Height as of this encounter: 1.6 m (5' 3\").    Weight as of this encounter: 71.4 kg (157 lb 8 oz).         Patient Instructions   Limit water intake to one gallon per day  Use the electrolyte liberally  At this time, do hold the Mothers Milk Plus  Schedule 8 am cortisol lab    Subjective   Giulia is a 28 year old, presenting for the following health issues:  ER F/U    HPI       ED/UC Followup:    Facility:  [facility]  Date of visit: 5/9/24  Reason for visit: hyponatremia  Current Status: unchanged    This was second episode of " "hyponatremia. First episode was immediately postpartum. Attributed to blood loss and resolved with saline infusion. Lorri rapidly with saline infusion both times.     Experiencing times of profound fatigue out of nowhere. Doesn't feel right. No fever or chills. Feels very thirsty which is unchanged since pregnancy. Mild headache almost everyday for past three days. Some ear and sinus pain. Had sinus infection in postpartum and received abx. Also has allergies. Using nasal saline and sometimes flonase, no antihistamine. Episodes of lightheadedness. Urinating 6-7 times a day - generally yellow, not concentrated. No edema currently. Did last couple weeks of pregnancy. Continuing to have postpartum bleeding and clots and has ultrasound scheduled for Friday per prenatal clinic. Some cramps with the bleeding.     Volume intake: 1.5-2 gallons per day. Very concerned about milk supply if lowers water intake.    Taking an electrolyte packet every other day prior to Friday and now once per day. Feels more hydrated when taking the electrolyte packet.     History of anemia since teenage years treated with iron supplement. Anemia in pregnancy treated with oral iron. Has thalassemia mutation.    Hx high blood pressure twice in immediate postpartum. Prescribed nifedipine. Enrolled in blood pressure program and measuring twice a day at home. Instructed to stop nifedipine 5 days after starting.  126/77, 117/81 today    Taking More Milk Plus - fenugreek, blessed thistle, nettle leaf, fennel, sunflower lecithin, palm kernel oil.      Review of Systems  Constitutional, neuro, ENT, endocrine, pulmonary, cardiac, gastrointestinal, genitourinary, musculoskeletal, integument and psychiatric systems are negative, except as otherwise noted.      Objective    /70   Pulse 110   Temp 98.2  F (36.8  C) (Temporal)   Resp 12   Ht 1.6 m (5' 3\")   Wt 71.4 kg (157 lb 8 oz)   LMP 06/09/2023   SpO2 100%   Breastfeeding Yes   BMI 27.90 " kg/m    Body mass index is 27.9 kg/m .    Physical Exam   GENERAL: alert and no distress  EYES: Eyes grossly normal to inspection, PERRL and conjunctivae and sclerae normal  HENT: ear canals and TM's normal, nose and mouth without ulcers or lesions  NECK: no adenopathy, no asymmetry, masses, or scars  RESP: lungs clear to auscultation - no rales, rhonchi or wheezes  CV: regular rate and rhythm, normal S1 S2, no S3 or S4, no murmur, click or rub, no peripheral edema   MS: no gross musculoskeletal defects noted, no edema  SKIN: no suspicious lesions or rashes  NEURO: Normal strength and tone, mentation intact and speech normal  PSYCH: mentation appears normal, affect normal/bright, tearful and anxious at times when discussing worries    Results for orders placed or performed in visit on 05/13/24 (from the past 24 hour(s))   Basic metabolic panel  (Ca, Cl, CO2, Creat, Gluc, K, Na, BUN)   Result Value Ref Range    Sodium 136 135 - 145 mmol/L    Potassium 4.3 3.4 - 5.3 mmol/L    Chloride 101 98 - 107 mmol/L    Carbon Dioxide (CO2) 24 22 - 29 mmol/L    Anion Gap 11 7 - 15 mmol/L    Urea Nitrogen 7.5 6.0 - 20.0 mg/dL    Creatinine 0.56 0.51 - 0.95 mg/dL    GFR Estimate >90 >60 mL/min/1.73m2    Calcium 9.6 8.6 - 10.0 mg/dL    Glucose 93 70 - 99 mg/dL   Osmolality urine   Result Value Ref Range    Osmolality Urine 80 (L) 100 - 1,200 mmol/kg    Narrative    Reference Ranges depend on patient's hydration status and renal function.   Neonates:  mmol/kg   2 years and older, random specimens: 100-1200 mmol/kg; Greater than 850 mmol/kg after 12 hour fluid restriction  Urine/serum osmolality ratio: 2 years and older: 1.0-3.0; 3.0-4.7 after 12 hour fluid restriction   Sodium random urine   Result Value Ref Range    Sodium Urine mmol/L <20 mmol/L           Signed Electronically by: WYATT Dooley CNP

## 2024-05-14 ENCOUNTER — E-CONSULT (OUTPATIENT)
Dept: NEPHROLOGY | Facility: CLINIC | Age: 29
End: 2024-05-14
Payer: COMMERCIAL

## 2024-05-14 ENCOUNTER — LAB (OUTPATIENT)
Dept: LAB | Facility: CLINIC | Age: 29
End: 2024-05-14
Payer: COMMERCIAL

## 2024-05-14 DIAGNOSIS — E87.1 HYPONATREMIA: ICD-10-CM

## 2024-05-14 DIAGNOSIS — E87.1 HYPONATREMIA: Primary | ICD-10-CM

## 2024-05-14 LAB — CORTIS SERPL-MCNC: 16.8 UG/DL

## 2024-05-14 PROCEDURE — 36415 COLL VENOUS BLD VENIPUNCTURE: CPT

## 2024-05-14 PROCEDURE — 99451 NTRPROF PH1/NTRNET/EHR 5/>: CPT | Performed by: STUDENT IN AN ORGANIZED HEALTH CARE EDUCATION/TRAINING PROGRAM

## 2024-05-14 PROCEDURE — 82533 TOTAL CORTISOL: CPT

## 2024-05-14 NOTE — RESULT ENCOUNTER NOTE
Giulia,    The cortisol is normal. Thank you for making the point of coming in this morning!  The e-consult to nephrology is in so I'm looking for that today, but it might be tomorrow. How is it going drinking less water?  If you have any questions, please feel free to contact the clinic.    SCOTT Taylor

## 2024-05-14 NOTE — PROGRESS NOTES
5/14/2024     E-Consult has been accepted.    Interprofessional consultation requested by:  Yarelis Fields APRN CNP      Clinical Question/Purpose: recurrent episodes of hyponatremia   First - immediate postpartum, attributed to blood loss   Second - 6 weeks postpartum, sent to ED, given IV saline   Today - normalized sodium, low urine osmolality, urine Na<20, cortisol pending     Sx: polydipsia, dizziness, discrete episodes of profound fatigue and weakness, mild headaches. Urinates at least 6 times a day. Feels dehydrated if she drinks less than 1 gallon.   Exam: unremarkable, no edema. Fluid status by exam seems WNL - good skin turgor, mucous membranes moist     Drinking 1.5-2 gallons water per day, which is described as pre-birth baseline. Has been advised to reduce to maximum 1 gallon starting today.     PMH:   Breastfeeding currently   Blood loss in birth   Gestational HTN, brief and resolved treated with nifedipine for 5 days   Anemia and thalassemia alpha carrier - rebounded from delivery and now is low 11s   Postpartum bleeding being evaluated by women's health by US in four days     The most current assessment of this problem can be found in the note dated 5/13/24.     Patient assessment and information reviewed: Recent notes, labs, vital signs, med list, history and problem lists reviewed.     This is a 29 yo F with recent hyponatremia, though currently resolved, with a high water intake and polydipsia symptoms. Serum creatinine is ~0.6 mg/d.     Recommendations: First, I agree that this patient must decrease her water intake. Her urine is as maximally dilute as is physiologically possible - and thus her hyponatremia may simply be 'water potomania'. Of course, this might be driven by polydipsia, which the ordering provider will need to work up. Of note, patient's vitals are seemingly normal, and AM cortisol is not low (not an obvious case of adrenal insufficiency). This is obviously not a case of  polyuria and hypernatremia causing polydipsia. Serum glucose has been normal. Thus, the cause of polydipsia (e.g, dry mouth, Sjogren's disease, psychogenic polydipsia, etc.) is not obvious upon chart review.     Continue to monitor the patient's BMP with this reduction in fluid intake and refer to nephrology if her sodium drops again despite her cutting her fluid intake to 1 gallon per day (which is 3.8 L, which is sufficient absent polyuria). All source of fluid would count in this reduction in daily consumption (e.g, water, coffee, juice, etc.).    Also ensure that the patient is eating (including proteins and salts, though obviously I am not recommending a high salt diet for a patient with HTN). Tea-and-toast diet could present like this (which would then be compounded by high water intake). She has had a low BUN.           The recommendations provided in this E-Consult are based on a review of clinical data pertinent to the clinical question presented, without a review of the patient's complete medical record or, the benefit of a comprehensive in-person or virtual patient evaluation. This consultation should not replace the clinical judgement and evaluation of the provider ordering this E-Consult. Any new clinical issues, or changes in patient status since the filing of this E-Consult will need to be taken into account when assessing these recommendations. Please contact me if you have further questions.    My total time spent reviewing clinical information and formulating assessment was 10 minutes.        Jayesh Greenwood MD  Nephrology

## 2024-05-15 NOTE — TELEPHONE ENCOUNTER
First, I agree that this patient must decrease her water intake. Her urine is as maximally dilute as is physiologically possible - and thus her hyponatremia may simply be 'water potomania'. Of course, this might be driven by polydipsia, which the ordering provider will need to work up. Of note, patient's vitals are seemingly normal, and AM cortisol is not low (not an obvious case of adrenal insufficiency). This is obviously not a case of polyuria and hypernatremia causing polydipsia. Serum glucose has been normal. Thus, the cause of polydipsia (e.g, dry mouth, Sjogren's disease, psychogenic polydipsia, etc.) is not obvious upon chart review.      Continue to monitor the patient's BMP with this reduction in fluid intake and refer to nephrology if her sodium drops again despite her cutting her fluid intake to 1 gallon per day (which is 3.8 L, which is sufficient absent polyuria). All source of fluid would count in this reduction in daily consumption (e.g, water, coffee, juice, etc.).     Also ensure that the patient is eating (including proteins and salts, though obviously I am not recommending a high salt diet for a patient with HTN). Tea-and-toast diet could present like this (which would then be compounded by high water intake). She has had a low BUN.       Wt Readings from Last 5 Encounters:   05/13/24 71.4 kg (157 lb 8 oz)   05/09/24 73 kg (161 lb)   05/09/24 73.1 kg (161 lb 3.2 oz)   04/17/24 72.4 kg (159 lb 11.2 oz)   04/11/24 71 kg (156 lb 9.6 oz)

## 2024-05-17 ENCOUNTER — ANCILLARY PROCEDURE (OUTPATIENT)
Dept: ULTRASOUND IMAGING | Facility: CLINIC | Age: 29
End: 2024-05-17
Attending: MIDWIFE
Payer: COMMERCIAL

## 2024-05-17 PROCEDURE — 76856 US EXAM PELVIC COMPLETE: CPT | Mod: 26 | Performed by: OBSTETRICS & GYNECOLOGY

## 2024-05-17 PROCEDURE — 76830 TRANSVAGINAL US NON-OB: CPT

## 2024-05-17 PROCEDURE — 76830 TRANSVAGINAL US NON-OB: CPT | Mod: 26 | Performed by: OBSTETRICS & GYNECOLOGY

## 2024-05-17 PROCEDURE — 76856 US EXAM PELVIC COMPLETE: CPT

## 2024-05-20 ENCOUNTER — LAB (OUTPATIENT)
Dept: LAB | Facility: CLINIC | Age: 29
End: 2024-05-20
Payer: COMMERCIAL

## 2024-05-20 DIAGNOSIS — E87.1 HYPONATREMIA: ICD-10-CM

## 2024-05-20 LAB
ANION GAP SERPL CALCULATED.3IONS-SCNC: 12 MMOL/L (ref 7–15)
BUN SERPL-MCNC: 10.6 MG/DL (ref 6–20)
CALCIUM SERPL-MCNC: 10.3 MG/DL (ref 8.6–10)
CHLORIDE SERPL-SCNC: 101 MMOL/L (ref 98–107)
CREAT SERPL-MCNC: 0.69 MG/DL (ref 0.51–0.95)
DEPRECATED HCO3 PLAS-SCNC: 25 MMOL/L (ref 22–29)
EGFRCR SERPLBLD CKD-EPI 2021: >90 ML/MIN/1.73M2
GLUCOSE SERPL-MCNC: 145 MG/DL (ref 70–99)
POTASSIUM SERPL-SCNC: 4.3 MMOL/L (ref 3.4–5.3)
SODIUM SERPL-SCNC: 138 MMOL/L (ref 135–145)

## 2024-05-20 PROCEDURE — 80048 BASIC METABOLIC PNL TOTAL CA: CPT

## 2024-05-20 PROCEDURE — 36415 COLL VENOUS BLD VENIPUNCTURE: CPT

## 2024-05-20 NOTE — RESULT ENCOUNTER NOTE
Giulia,    Your sodium looks great, as do the other electrolytes. The high calcium is almost always an erroneous lab lately so I don't have concerns about that. When we recheck sodium, it will include that as well. I'm thinking we will recheck in one week from now.    How are you feeling? What symptoms are you having still? How is thirst?    I wouldn't change plans from what you are doing currently in terms of fluid intake, electrolyte packets, food, etc.     If you have any questions, please feel free to contact the clinic.    SCOTT Taylor  
Feeding and  care were discussed today and parent questions were answered

## 2024-05-28 ENCOUNTER — LAB (OUTPATIENT)
Dept: LAB | Facility: CLINIC | Age: 29
End: 2024-05-28
Payer: COMMERCIAL

## 2024-05-28 ENCOUNTER — DOCUMENTATION ONLY (OUTPATIENT)
Dept: LAB | Facility: CLINIC | Age: 29
End: 2024-05-28

## 2024-05-28 DIAGNOSIS — Z78.9 EXCLUSIVE BREASTFEEDING BY MOTHER: Primary | ICD-10-CM

## 2024-05-28 DIAGNOSIS — E87.1 HYPONATREMIA: ICD-10-CM

## 2024-05-28 DIAGNOSIS — Z11.4 SCREENING FOR HIV (HUMAN IMMUNODEFICIENCY VIRUS): ICD-10-CM

## 2024-05-28 DIAGNOSIS — E87.1 HYPONATREMIA: Primary | ICD-10-CM

## 2024-05-28 DIAGNOSIS — Z11.59 NEED FOR HEPATITIS C SCREENING TEST: ICD-10-CM

## 2024-05-28 LAB
ANION GAP SERPL CALCULATED.3IONS-SCNC: 14 MMOL/L (ref 7–15)
BUN SERPL-MCNC: 13.6 MG/DL (ref 6–20)
CALCIUM SERPL-MCNC: 9.9 MG/DL (ref 8.6–10)
CHLORIDE SERPL-SCNC: 103 MMOL/L (ref 98–107)
CREAT SERPL-MCNC: 0.67 MG/DL (ref 0.51–0.95)
DEPRECATED HCO3 PLAS-SCNC: 23 MMOL/L (ref 22–29)
EGFRCR SERPLBLD CKD-EPI 2021: >90 ML/MIN/1.73M2
GLUCOSE SERPL-MCNC: 95 MG/DL (ref 70–99)
HCV AB SERPL QL IA: NONREACTIVE
HIV 1+2 AB+HIV1 P24 AG SERPL QL IA: NONREACTIVE
HOLD SPECIMEN: NORMAL
POTASSIUM SERPL-SCNC: 4.2 MMOL/L (ref 3.4–5.3)
SODIUM SERPL-SCNC: 140 MMOL/L (ref 135–145)

## 2024-05-28 PROCEDURE — 87389 HIV-1 AG W/HIV-1&-2 AB AG IA: CPT

## 2024-05-28 PROCEDURE — 36415 COLL VENOUS BLD VENIPUNCTURE: CPT

## 2024-05-28 PROCEDURE — 86803 HEPATITIS C AB TEST: CPT

## 2024-05-28 PROCEDURE — 80048 BASIC METABOLIC PNL TOTAL CA: CPT

## 2024-05-28 NOTE — PROGRESS NOTES
Antoine Santoyo, this patient has lab only appointment today at 9:45 but has no orders in her chart. Would you mind placing orders if she needs some.    Thank you,    [place], LAB

## 2024-05-29 ENCOUNTER — MYC MEDICAL ADVICE (OUTPATIENT)
Dept: FAMILY MEDICINE | Facility: CLINIC | Age: 29
End: 2024-05-29
Payer: COMMERCIAL

## 2024-05-29 NOTE — RESULT ENCOUNTER NOTE
Giulia,    Sodium continues to be normal. The calcium returned to the normal range. Your glucose which was elevated before is now normal, as well.  The hep C and HIV are negative (those were system generated and we will not check these again unless there are indications to do so in the future).  If you have any questions, please feel free to contact the clinic.    SCOTT Taylor

## 2024-10-29 DIAGNOSIS — J06.9 VIRAL URI WITH COUGH: Primary | ICD-10-CM

## 2024-10-29 RX ORDER — ALBUTEROL SULFATE 90 UG/1
2 INHALANT RESPIRATORY (INHALATION) EVERY 6 HOURS PRN
Qty: 18 G | Refills: 1 | Status: SHIPPED | OUTPATIENT
Start: 2024-10-29

## 2024-10-29 NOTE — PROGRESS NOTES
Sick two weeks ago. Throat is very sore.  Started getting cough this past Tuesday that worsened toward end of last week. Wheezing badly starting Sat night. Tried old albuterol that was prescribed for allergies in the past. The albuterol helped.     In with son's appt. Lung sounds expiratory wheezing upper bilateral. No rales. Prescribing albuterol.    SCOTT Taylor

## 2024-10-30 ENCOUNTER — TELEPHONE (OUTPATIENT)
Dept: FAMILY MEDICINE | Facility: CLINIC | Age: 29
End: 2024-10-30
Payer: COMMERCIAL

## 2024-10-30 NOTE — TELEPHONE ENCOUNTER
Prior Authorization request for albuterol (PROAIR HFA/PROVENTIL HFA/VENTOLIN HFA) 108 (90 Base) MCG/ACT inhaler

## 2024-10-31 NOTE — TELEPHONE ENCOUNTER
Refill team doesn't do prior authorization's. Routing to care team to initiate prior authorization.

## 2024-11-01 NOTE — TELEPHONE ENCOUNTER
Pt needs PA for Albuterol, please assist   Thank you,   Yash Morrow RN  [facility]

## 2024-11-01 NOTE — TELEPHONE ENCOUNTER
Prior Authorization Not Needed per Insurance    Medication: ALBUTEROL SULFATE  (90 BASE) MCG/ACT IN AERS  Insurance Company: [employer] - Phone [phone] Fax [phone]  Expected CoPay: $    Pharmacy Filling the Rx: [id number] [place], [place] - [address]   Pharmacy Notified: y  Patient Notified: Instructed pharmacy to notify patient once order is ready.     Pharmacy advised PA is not needed.

## 2024-11-05 ENCOUNTER — OFFICE VISIT (OUTPATIENT)
Dept: FAMILY MEDICINE | Facility: CLINIC | Age: 29
End: 2024-11-05
Payer: COMMERCIAL

## 2024-11-05 VITALS
RESPIRATION RATE: 10 BRPM | TEMPERATURE: 99.1 F | OXYGEN SATURATION: 99 % | SYSTOLIC BLOOD PRESSURE: 125 MMHG | WEIGHT: 186.5 LBS | BODY MASS INDEX: 31.07 KG/M2 | DIASTOLIC BLOOD PRESSURE: 86 MMHG | HEART RATE: 104 BPM | HEIGHT: 65 IN

## 2024-11-05 DIAGNOSIS — O92.5 LACTATION SUPPRESSION: ICD-10-CM

## 2024-11-05 DIAGNOSIS — R10.84 ABDOMINAL PAIN, GENERALIZED: ICD-10-CM

## 2024-11-05 DIAGNOSIS — R19.7 DIARRHEA, UNSPECIFIED TYPE: ICD-10-CM

## 2024-11-05 DIAGNOSIS — Z00.00 ROUTINE GENERAL MEDICAL EXAMINATION AT A HEALTH CARE FACILITY: Primary | ICD-10-CM

## 2024-11-05 DIAGNOSIS — Z12.4 SCREENING FOR MALIGNANT NEOPLASM OF CERVIX: ICD-10-CM

## 2024-11-05 PROCEDURE — G0145 SCR C/V CYTO,THINLAYER,RESCR: HCPCS | Performed by: NURSE PRACTITIONER

## 2024-11-05 PROCEDURE — 99213 OFFICE O/P EST LOW 20 MIN: CPT | Mod: 25 | Performed by: NURSE PRACTITIONER

## 2024-11-05 PROCEDURE — 99395 PREV VISIT EST AGE 18-39: CPT | Performed by: NURSE PRACTITIONER

## 2024-11-05 SDOH — HEALTH STABILITY: PHYSICAL HEALTH: ON AVERAGE, HOW MANY DAYS PER WEEK DO YOU ENGAGE IN MODERATE TO STRENUOUS EXERCISE (LIKE A BRISK WALK)?: 2 DAYS

## 2024-11-05 ASSESSMENT — PAIN SCALES - GENERAL: PAINLEVEL_OUTOF10: NO PAIN (0)

## 2024-11-05 ASSESSMENT — SOCIAL DETERMINANTS OF HEALTH (SDOH): HOW OFTEN DO YOU GET TOGETHER WITH FRIENDS OR RELATIVES?: TWICE A WEEK

## 2024-11-05 NOTE — PATIENT INSTRUCTIONS
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  If you aren't seeing an increase in milk supply, you can get labs done at \"lab only\" appt      [facility] - [phone]  Pamella Echevarria  at [facility]    Outside of [place] - recommended by patients who have had good experiences    [facility] - [email or web address]    Nica Dasilva [email or web address]    [facility]  Christina Gonzalez   [phone]    [facility] in [place] - birth trauma, body work  [phone]    [facility] and [place] on [address] [employer]  [phone]    [facility]  [phone]  [address]   [place], [place], [address]    Drew Rob - \"sex-positive\"  [address]  [address]  [place], [place] [address]     [facility] on [facility] in [place]  [address], [address]  [place] [address]  Telephone: [phone]    Sonali Villela  [address]  [place] [address]  Phone: [phone] X [phone]  [email or web address]    [facility]  [email or web address]  [address]  [place] [address]  Tel: [phone]    Wayne Lewis  [email or web address]     Dr. Lainey Willett [facility]    María Choe - people aged 11 and up.  [email or web address], or to leave a voicemail at [phone] ext. [phone]      "

## 2024-11-05 NOTE — PROGRESS NOTES
"Preventive Care Visit  [facility]  WYATT Dooley CNP, Nurse Practitioner - Family  Nov 5, 2024      Assessment & Plan     Routine general medical examination at a health care facility  Reviewed and updated health maintenance and recommendations  Varicella given due to non-immune status discovered in pregnancy    Lactation suppression  Continue moringa and add extra pumping  If not seeing changes in supply with these, get labs  - TSH with free T4 reflex; Future  - Prolactin; Future    Diarrhea, unspecified type  Could be moringa, but that should not cause the abdominal pain. Pain after eating suggests possibly gallbladder. US ordered  - US Abdomen Complete; Future    Abdominal pain, generalized  As above  - US Abdomen Complete; Future    Screening for malignant neoplasm of cervix  - Pap Screen Reflex to HPV if ASCUS - Recommended Age 25 - 29 Years; Future  - Pap Screen Reflex to HPV if ASCUS - Recommended Age 25 - 29 Years  - HPV Hold (Lab Only)    Patient has been advised of split billing requirements and indicates understanding: Yes        BMI  Estimated body mass index is 30.89 kg/m  as calculated from the following:    Height as of this encounter: 1.655 m (5' 5.16\").    Weight as of this encounter: 84.6 kg (186 lb 8 oz).   Weight management plan: is postpartum    Counseling  Appropriate preventive services were addressed with this patient via screening, questionnaire, or discussion as appropriate for fall prevention, nutrition, physical activity, Tobacco-use cessation, social engagement, weight loss and cognition.  Checklist reviewing preventive services available has been given to the patient.      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  If you aren't seeing an increase in milk supply, you can get labs done at \"lab only\" appt      [facility] - [phone]  Pamella Echevarria  at [facility] at [facility]    Outside of [place] - recommended by patients who have had good experiences    [facility] - [email or web address]    Nica Dasilva [email or web address]    [facility]  Christina Carlos   [phone]    [facility] in [employer] [place] - birth trauma, body work  [phone]    [facility] [employer] [place] on [address]  [phone]    Melva [place]  [phone]  [address]   [place], [place], [address]    Drew Rob - \"sex-positive\"  [address]  [address]  [place], [place] [address]     [facility] on [address] in [place]  [address]  [place] [address]  Telephone: [phone]    Sonali Villela  [address]  [place] [address]  Phone: [phone]  [email or web address]    [facility]  [email or web address]  [address]  [place] [address]  Tel: [phone]    Wayne Lewis  [email or web address]     Dr. Lainey Willett [facility]    María Choe - people aged 11 and up.  [email or web address], or to leave a voicemail at [phone] ext. [phone]      Breanna Platt is a 28 year old, presenting for the following:  Physical and upset stomach (Upset stomach since giving birth, diarrhea)        11/5/2024     2:58 PM   Additional Questions   Roomed by Jessica SWAN         11/5/2024     2:58 PM   Patient Reported Additional Medications   Patient " reports taking the following new medications iron supplement          HPI      Home BPs 115/78    Milk supply has dropped significantly  3-5 ounces to 2-3 ounces per pumping  Jewel is full after feedings at the breast  Restarted moringa and varner's yeast two days ago     SOB with exercise    Ear pain on right side since ear infection    Stomach has been very off - foods that used to be fine cause generalized stomachache and diarrhea. No fevers outside of URIs. No blood or mucous in the stools.  Dairy, cheese, spicy foods, greasy foods  If eating bland foods, has normal bowel movements    Sexual health - periods have not returned, sex is comfortable, libido is lower than before birth     Health Care Directive  Patient does not have a Health Care Directive:       11/5/2024   General Health   How would you rate your overall physical health? Good   Feel stress (tense, anxious, or unable to sleep) Only a little      (!) STRESS CONCERN      11/5/2024   Nutrition   Three or more servings of calcium each day? Yes   Diet: Vegetarian/vegan   How many servings of fruit and vegetables per day? 4 or more   How many sweetened beverages each day? 0-1            11/5/2024   Exercise   Days per week of moderate/strenous exercise 2 days - walks      (!) EXERCISE CONCERN      11/5/2024   Social Factors   Frequency of gathering with friends or relatives Twice a week   Worry food won't last until get money to buy more No   Food not last or not have enough money for food? No   Do you have housing? (Housing is defined as stable permanent housing and does not include staying ouside in a car, in a tent, in an abandoned building, in an overnight shelter, or couch-surfing.) Yes   Are you worried about losing your housing? No   Lack of transportation? No   Unable to get utilities (heat,electricity)? No            11/5/2024   Dental   Dentist two times every year? Yes            11/5/2024   TB Screening   Were you born outside of the [place]? Yes  "             Today's PHQ-2 Score:       2/27/2024     8:09 AM   PHQ-2 ( 1999 [employer])   Q1: Little interest or pleasure in doing things 0    Q2: Feeling down, depressed or hopeless 0    PHQ-2 Score 0   Q1: Little interest or pleasure in doing things Not at all   Q2: Feeling down, depressed or hopeless Not at all   PHQ-2 Score 0       Patient-reported         11/5/2024   Substance Use   Alcohol more than 3/day or more than 7/wk No   Do you use any other substances recreationally? No        Social History     Tobacco Use    Smoking status: Never    Smokeless tobacco: Never   Vaping Use    Vaping status: Never Used   Substance Use Topics    Alcohol use: Not Currently    Drug use: Never          Mammogram Screening - Patient under 40 years of age: Routine Mammogram Screening not recommended.         11/5/2024   STI Screening   New sexual partner(s) since last STI/HIV test? No        History of abnormal Pap smear: No - age 21-29 PAP every 3 years recommended             11/5/2024   Contraception/Family Planning   Questions about contraception or family planning No           Reviewed and updated as needed this visit by Provider   Tobacco   Meds   Med Hx  Surg Hx  Fam Hx  Soc Hx Sexual Activity          History reviewed. No pertinent past medical history.  Past Surgical History:   Procedure Laterality Date    WISDOM TOOTH EXTRACTION Bilateral 2013         Review of Systems  Constitutional, neuro, ENT, endocrine, pulmonary, cardiac, gastrointestinal, genitourinary, musculoskeletal, integument and psychiatric systems are negative, except as otherwise noted.     Objective    Exam  /86 (BP Location: Left arm, Patient Position: Sitting, Cuff Size: Adult Regular)   Pulse 104   Temp 99.1  F (37.3  C) (Temporal)   Resp 10   Ht 1.655 m (5' 5.16\")   Wt 84.6 kg (186 lb 8 oz)   SpO2 99%   Breastfeeding Yes   BMI 30.89 kg/m     Estimated body mass index is 30.89 kg/m  as calculated from the following:    Height as of " "this encounter: 1.655 m (5' 5.16\").    Weight as of this encounter: 84.6 kg (186 lb 8 oz).    Physical Exam  GENERAL: alert and no distress  EYES: Eyes grossly normal to inspection, PERRL and conjunctivae and sclerae normal  HENT: ear canals and TM's normal, nose and mouth without ulcers or lesions  NECK: no adenopathy, no asymmetry, masses, or scars  RESP: lungs clear to auscultation - no rales, rhonchi or wheezes  CV: regular rate and rhythm, normal S1 S2, no S3 or S4, no murmur, click or rub, no peripheral edema  ABDOMEN: soft, nontender, no hepatosplenomegaly, no masses and bowel sounds normal   (female) w/bimanual: normal female external genitalia, normal urethral meatus, normal vaginal mucosa, and normal cervix/adnexa/uterus without masses or discharge  MS: no gross musculoskeletal defects noted, no edema  SKIN: no suspicious lesions or rashes  NEURO: Normal strength and tone, mentation intact and speech normal  PSYCH: mentation appears normal, affect normal/bright        Signed Electronically by: WYATT Dooley CNP    "

## 2024-11-06 ENCOUNTER — ALLIED HEALTH/NURSE VISIT (OUTPATIENT)
Dept: FAMILY MEDICINE | Facility: CLINIC | Age: 29
End: 2024-11-06
Payer: COMMERCIAL

## 2024-11-06 DIAGNOSIS — Z23 ENCOUNTER FOR IMMUNIZATION: Primary | ICD-10-CM

## 2024-11-06 PROCEDURE — 90656 IIV3 VACC NO PRSV 0.5 ML IM: CPT

## 2024-11-06 PROCEDURE — 99207 PR NO CHARGE NURSE ONLY: CPT

## 2024-11-06 PROCEDURE — 90471 IMMUNIZATION ADMIN: CPT

## 2024-11-06 PROCEDURE — 90716 VAR VACCINE LIVE SUBQ: CPT

## 2024-11-06 PROCEDURE — 90472 IMMUNIZATION ADMIN EACH ADD: CPT

## 2024-11-06 NOTE — PROGRESS NOTES
Prior to immunization administration, verified patients identity using patient s name and date of birth. Please see Immunization Activity for additional information.     Is the patient's temperature normal (100.5 or less)? Yes     Patient MEETS CRITERIA. PROCEED with vaccine administration.          11/6/2024   INFLUENZA   Would you like to receive the flu shot or the nasal flu vaccine today? Flu Shot   Have you had a serious reaction to a flu vaccine or something in a flu vaccine? No   Have you had Guillain-[place] syndrome within 6 weeks of getting a vaccine? No   Are you pregnant or is there a chance you could become pregnant during the next month? No   Have you received a bone marrow transplant within the previous 6 months? No            Patient MEETS CRITERIA. PROCEED with vaccine administration.             No data to display                  Patient MEETS CRITERIA. PROCEED with vaccine administration.                Patient instructed to remain in clinic for 15 minutes afterwards, and to report any adverse reactions.      Link to Ancillary Visit Immunization Standing Orders SmartSet     Screening performed by Nicole Capellan on 11/6/2024 at 3:44 PM.

## 2024-11-12 ENCOUNTER — HOSPITAL ENCOUNTER (OUTPATIENT)
Dept: ULTRASOUND IMAGING | Facility: CLINIC | Age: 29
Discharge: HOME | End: 2024-11-12
Attending: NURSE PRACTITIONER | Admitting: NURSE PRACTITIONER
Payer: COMMERCIAL

## 2024-11-12 DIAGNOSIS — R10.84 ABDOMINAL PAIN, GENERALIZED: ICD-10-CM

## 2024-11-12 DIAGNOSIS — R19.7 DIARRHEA, UNSPECIFIED TYPE: ICD-10-CM

## 2024-11-12 LAB
BKR LAB AP GYN ADEQUACY: NORMAL
BKR LAB AP GYN INTERPRETATION: NORMAL
BKR LAB AP HPV REFLEX: NORMAL
BKR LAB AP PREVIOUS ABNORMAL: NORMAL
PATH REPORT.COMMENTS IMP SPEC: NORMAL
PATH REPORT.COMMENTS IMP SPEC: NORMAL
PATH REPORT.RELEVANT HX SPEC: NORMAL

## 2024-11-12 PROCEDURE — 76700 US EXAM ABDOM COMPLETE: CPT

## 2024-11-12 NOTE — RESULT ENCOUNTER NOTE
Giulia,    Your abdominal ultrasound was entirely normal.  If you have any questions, please feel free to contact the clinic.    SCOTT Taylor

## 2024-11-15 PROBLEM — Z23 NEED FOR VARICELLA VACCINE: Status: RESOLVED | Noted: 2024-03-28 | Resolved: 2024-11-15

## 2024-11-15 PROBLEM — O13.9 GESTATIONAL HYPERTENSION: Status: RESOLVED | Noted: 2024-03-28 | Resolved: 2024-11-15

## 2024-11-15 PROBLEM — Z37.9 VACUUM-ASSISTED VAGINAL DELIVERY: Status: RESOLVED | Noted: 2024-03-28 | Resolved: 2024-11-15

## 2024-11-15 PROBLEM — D62 ANEMIA DUE TO BLOOD LOSS, ACUTE: Status: RESOLVED | Noted: 2024-03-29 | Resolved: 2024-11-15
